# Patient Record
Sex: MALE | Race: WHITE | NOT HISPANIC OR LATINO
[De-identification: names, ages, dates, MRNs, and addresses within clinical notes are randomized per-mention and may not be internally consistent; named-entity substitution may affect disease eponyms.]

---

## 2018-08-14 ENCOUNTER — CHARTING TRANS (OUTPATIENT)
Dept: OTHER | Age: 43
End: 2018-08-14

## 2018-08-29 ENCOUNTER — IMAGING SERVICES (OUTPATIENT)
Dept: OTHER | Age: 43
End: 2018-08-29

## 2018-08-29 ENCOUNTER — CHARTING TRANS (OUTPATIENT)
Dept: OTHER | Age: 43
End: 2018-08-29

## 2018-11-27 VITALS
SYSTOLIC BLOOD PRESSURE: 140 MMHG | WEIGHT: 225 LBS | BODY MASS INDEX: 30.48 KG/M2 | DIASTOLIC BLOOD PRESSURE: 102 MMHG | TEMPERATURE: 98.1 F | OXYGEN SATURATION: 96 % | HEART RATE: 85 BPM | HEIGHT: 72 IN

## 2018-12-15 ENCOUNTER — HOSPITAL ENCOUNTER (OUTPATIENT)
Age: 43
Discharge: HOME | End: 2018-12-15
Attending: EMERGENCY MEDICINE
Payer: COMMERCIAL

## 2018-12-15 VITALS
WEIGHT: 215 LBS | OXYGEN SATURATION: 97 % | HEART RATE: 104 BPM | BODY MASS INDEX: 29 KG/M2 | RESPIRATION RATE: 16 BRPM | DIASTOLIC BLOOD PRESSURE: 93 MMHG | TEMPERATURE: 98 F | SYSTOLIC BLOOD PRESSURE: 144 MMHG

## 2018-12-15 DIAGNOSIS — S61.209A AVULSION OF FINGER TIP, INITIAL ENCOUNTER: Primary | ICD-10-CM

## 2018-12-15 PROCEDURE — 99202 OFFICE O/P NEW SF 15 MIN: CPT

## 2018-12-15 PROCEDURE — 99203 OFFICE O/P NEW LOW 30 MIN: CPT

## 2018-12-15 NOTE — ED PROVIDER NOTES
Patient Seen in: [facility] Immediate Care In [place]    History   Patient presents with:  Laceration Abrasion (integumentary)    Stated Complaint: finger lac    HPI    Pt is 36 yo left handed M who p/w right 2nd finger avulsion s/p cutting it wi diagnosis)    Disposition:  Discharge  12/15/2018  4:32 pm    Follow-up:  Deysi Chinchilla MD  [address] [address]  [phone]      As needed        Medications Prescribed:  There are no discharge medications for this patient.

## 2019-03-20 ENCOUNTER — HOSPITAL ENCOUNTER (OUTPATIENT)
Age: 44
Discharge: HOME | End: 2019-03-20
Attending: EMERGENCY MEDICINE
Payer: COMMERCIAL

## 2019-03-20 ENCOUNTER — NURSE TRIAGE (OUTPATIENT)
Dept: OTHER | Age: 44
End: 2019-03-20

## 2019-03-20 ENCOUNTER — APPOINTMENT (OUTPATIENT)
Dept: CT IMAGING | Age: 44
End: 2019-03-20
Attending: EMERGENCY MEDICINE
Payer: COMMERCIAL

## 2019-03-20 VITALS
OXYGEN SATURATION: 100 % | TEMPERATURE: 98 F | SYSTOLIC BLOOD PRESSURE: 140 MMHG | RESPIRATION RATE: 16 BRPM | BODY MASS INDEX: 30 KG/M2 | WEIGHT: 220 LBS | DIASTOLIC BLOOD PRESSURE: 87 MMHG | HEART RATE: 98 BPM

## 2019-03-20 DIAGNOSIS — I10 HYPERTENSION, UNSPECIFIED TYPE: Primary | ICD-10-CM

## 2019-03-20 DIAGNOSIS — R51.9 ACUTE NONINTRACTABLE HEADACHE, UNSPECIFIED HEADACHE TYPE: ICD-10-CM

## 2019-03-20 LAB
#MXD IC: 0.9 X10ˆ3/UL (ref 0.1–1)
HCT VFR BLD AUTO: 48.5 % (ref 39–53)
HGB BLD-MCNC: 16.8 G/DL (ref 13–17.5)
LYMPHOCYTES # BLD AUTO: 1.3 X10ˆ3/UL (ref 1–4)
LYMPHOCYTES NFR BLD AUTO: 23.3 %
MCH RBC QN AUTO: 31.8 PG (ref 26–34)
MCHC RBC AUTO-ENTMCNC: 34.6 G/DL (ref 31–37)
MCV RBC AUTO: 91.9 FL (ref 80–100)
MIXED CELL %: 15.2 %
NEUTROPHILS # BLD AUTO: 3.4 X10ˆ3/UL (ref 1.5–7.7)
NEUTROPHILS NFR BLD AUTO: 61.5 %
PLATELET # BLD AUTO: 145 X10ˆ3/UL (ref 150–450)
RBC # BLD AUTO: 5.28 X10ˆ6/UL (ref 4.3–5.7)
WBC # BLD AUTO: 5.6 X10ˆ3/UL (ref 4–11)

## 2019-03-20 PROCEDURE — 70450 CT HEAD/BRAIN W/O DYE: CPT | Performed by: EMERGENCY MEDICINE

## 2019-03-20 PROCEDURE — 36415 COLL VENOUS BLD VENIPUNCTURE: CPT

## 2019-03-20 PROCEDURE — 99214 OFFICE O/P EST MOD 30 MIN: CPT

## 2019-03-20 PROCEDURE — 80047 BASIC METABLC PNL IONIZED CA: CPT

## 2019-03-20 PROCEDURE — 85025 COMPLETE CBC W/AUTO DIFF WBC: CPT | Performed by: EMERGENCY MEDICINE

## 2019-03-20 NOTE — ED NOTES
Assumed care for discharge, ct results relayed to patient by dr Georgi Null, discharge instructions given, follow up emphasized

## 2019-03-20 NOTE — ED PROVIDER NOTES
Patient Seen in: [facility] Immediate Care In [address] [place] [address]    History   Patient presents with:  Headache (neurologic)    Stated Complaint: headache    HPI    The patient is a 42-year-old male with no significant past medical history who presents now w Triage Vitals [03/20/19 1149]   BP (!) 142/100   Pulse 98   Resp 16   Temp 97.5 °F (36.4 °C)   Temp src Oral   SpO2 100 %   O2 Device None (Room air)       Current:BP (!) 142/100   Pulse 98   Temp 97.5 °F (36.4 °C) (Oral)   Resp 16   Wt 99.8 kg   SpO2 100% headache, meningitis, infectious causes such as pharyngitis, tension headache and sinusitis                Disposition and Plan     Clinical Impression:  Hypertension, unspecified type  (primary encounter diagnosis)  Acute nonintractable headache, unspecif

## 2019-03-20 NOTE — ED INITIAL ASSESSMENT (HPI)
Headache since Monday with fever. Pt had fever Monday and Tuesday. Pt had his bp checked yesterday and today by wife who is a nurse. +headache,+photosensitivity. No cold like symptoms, no n/v.  Pt has not seen a doctor in 9 years.

## 2019-03-20 NOTE — TELEPHONE ENCOUNTER
Attempted to triage pt and per chart pt has not been seen in 9 years. Advised pt can go to UC or ER. Also advised pt may be able to schedule OV with a provider today to establish care.   Attempted to transfer to [facility] to schedule OV to establish care and c
Fair

## 2019-03-20 NOTE — ED NOTES
Pt sent to [facility] for outpatient CT.     Spoke with Karen Castro RN at [facility]

## 2019-03-29 ENCOUNTER — TELEPHONE (OUTPATIENT)
Dept: INTERNAL MEDICINE CLINIC | Facility: CLINIC | Age: 44
End: 2019-03-29

## 2019-03-29 ENCOUNTER — OFFICE VISIT (OUTPATIENT)
Dept: INTERNAL MEDICINE CLINIC | Facility: CLINIC | Age: 44
End: 2019-03-29
Payer: COMMERCIAL

## 2019-03-29 VITALS
BODY MASS INDEX: 30.2 KG/M2 | SYSTOLIC BLOOD PRESSURE: 109 MMHG | RESPIRATION RATE: 22 BRPM | HEIGHT: 72 IN | DIASTOLIC BLOOD PRESSURE: 73 MMHG | WEIGHT: 223 LBS | HEART RATE: 85 BPM

## 2019-03-29 DIAGNOSIS — R51.9 HEADACHE DISORDER: Primary | ICD-10-CM

## 2019-03-29 PROBLEM — G89.29 CHRONIC BILATERAL LOW BACK PAIN WITHOUT SCIATICA: Status: ACTIVE | Noted: 2019-03-29

## 2019-03-29 PROBLEM — M54.50 CHRONIC BILATERAL LOW BACK PAIN WITHOUT SCIATICA: Status: ACTIVE | Noted: 2019-03-29

## 2019-03-29 PROBLEM — Z96.89 SPINAL CORD STIMULATOR STATUS: Status: ACTIVE | Noted: 2019-03-29

## 2019-03-29 PROCEDURE — 99212 OFFICE O/P EST SF 10 MIN: CPT | Performed by: INTERNAL MEDICINE

## 2019-03-29 PROCEDURE — 99203 OFFICE O/P NEW LOW 30 MIN: CPT | Performed by: INTERNAL MEDICINE

## 2019-03-29 RX ORDER — CYCLOBENZAPRINE HCL 10 MG
10 TABLET ORAL NIGHTLY
Qty: 30 TABLET | Refills: 1 | Status: SHIPPED | OUTPATIENT
Start: 2019-03-29 | End: 2019-03-30

## 2019-03-29 NOTE — TELEPHONE ENCOUNTER
Fax received at [address] [place] [address] with following message. Plan does not cover medication prescribed. Per Rx benefit plan alternative medications include; Metoprolol ER tabs. Please fax back with approval along with strength, directions, quantity, and refills. Current Outpatient Medications:        Metoprolol Succinate 25 MG Oral Capsule ER 24 Hour Sprinkle Take 25 mg by mouth 2 (two) times daily.  Disp: 180 capsule Rfl: 0

## 2019-03-29 NOTE — TELEPHONE ENCOUNTER
Fax received at [facility] requesting clarification. Note states -- We have attached pt allergic to flexeril . .. Please clarify and call/fax us back. .. Thanks.        Current Outpatient Medications:  Cyclobenzaprine HCl 10 MG Oral Tab Take 1 tablet (10 mg total)

## 2019-03-29 NOTE — PROGRESS NOTES
HPI:    Patient ID: Best Cervantes is a 37year old male. Resents for evaluation of the headache  HPI  Patient reports that 2 weeks ago he developed sudden onset of headache associated with high fever for 1 day.   Blood pressure was checked at home and it He is oriented to person, place, and time. He appears well-developed and well-nourished. No distress. HENT:   Head: Normocephalic and atraumatic.    Mouth/Throat: Oropharynx is clear and moist.   Eyes: Conjunctivae and EOM are normal. Pupils are equal, ro Oral Capsule ER 24 Hour Sprinkle 180 capsule 0     Sig: Take 25 mg by mouth 2 (two) times daily.        Imaging & Referrals:  None         OO#[id number]

## 2019-03-29 NOTE — TELEPHONE ENCOUNTER
Spoke with [facility] who reports on their end it says patient is allergic to Flexeril. Attempt made to contact patient to inquire on his allergies to medications; no answer LMTCB. Per chart review no known allergies.

## 2019-03-30 RX ORDER — CHLORZOXAZONE 500 MG/1
500 TABLET ORAL 3 TIMES DAILY PRN
Qty: 60 TABLET | Refills: 0 | Status: SHIPPED | OUTPATIENT
Start: 2019-03-30 | End: 2019-09-20

## 2019-03-30 NOTE — TELEPHONE ENCOUNTER
Spoke to the pharmacy, prescription was replaced by regular metoprolol succinate ER 25 mg capsule taken twice a day

## 2019-03-30 NOTE — TELEPHONE ENCOUNTER
Reviewed message from [employer] pharmacist with pt. Pt states he had Flexeril prescribed 10-11 years ago and thinks he had some side effects from that medication. Doesn't remember exactly what reaction he had, thinks it was psychological (depression).  Pt a

## 2019-03-30 NOTE — TELEPHONE ENCOUNTER
Patient advised of new script sent to pharmacy, reviewed at this time, no other questions, agreed to try alternative tx option sent today.

## 2019-03-31 PROBLEM — I10 ESSENTIAL HYPERTENSION: Status: ACTIVE | Noted: 2019-03-31

## 2019-04-02 ENCOUNTER — TELEPHONE (OUTPATIENT)
Dept: INTERNAL MEDICINE CLINIC | Facility: CLINIC | Age: 44
End: 2019-04-02

## 2019-04-02 RX ORDER — TIZANIDINE 4 MG/1
4 TABLET ORAL EVERY 8 HOURS PRN
Qty: 90 TABLET | Refills: 0 | Status: SHIPPED | OUTPATIENT
Start: 2019-04-02 | End: 2019-05-17

## 2019-04-02 RX ORDER — TIZANIDINE 4 MG/1
TABLET ORAL
Qty: 270 TABLET | Refills: 0 | OUTPATIENT
Start: 2019-04-02

## 2019-04-02 NOTE — TELEPHONE ENCOUNTER
Spoke to pt , change  Medication to  Tizanidine , warned  About  Sedation asked pt to  Report in few days    About  rogers  S

## 2019-04-02 NOTE — TELEPHONE ENCOUNTER
Patient called stating chlorzoxazone 500 MG Oral Tab too expensive. Called pharmacy and spoke to pharmacist and was told a cheaper alternative would be Baclofen, Soma, Tizanidine tablets, or Methocarbamol.     Dr. Josie Larson:   Please advise

## 2019-04-03 NOTE — TELEPHONE ENCOUNTER
Duplicate request.     Filled by MD.         Requested Prescriptions     Pending Prescriptions Disp Refills   • TIZANIDINE HCL 4 MG Oral Tab [Pharmacy Med Name: TIZANIDINE 4MG TABLETS] 270 tablet 0     Sig: TAKE 1 TABLET BY MOUTH EVERY 8 HOURS AS NEEDED

## 2019-04-15 ENCOUNTER — HOSPITAL ENCOUNTER (OUTPATIENT)
Age: 44
Discharge: HOME | End: 2019-04-15
Attending: EMERGENCY MEDICINE
Payer: COMMERCIAL

## 2019-04-15 ENCOUNTER — NURSE TRIAGE (OUTPATIENT)
Dept: OTHER | Age: 44
End: 2019-04-15

## 2019-04-15 VITALS
RESPIRATION RATE: 18 BRPM | HEIGHT: 72 IN | SYSTOLIC BLOOD PRESSURE: 121 MMHG | TEMPERATURE: 98 F | BODY MASS INDEX: 29.12 KG/M2 | DIASTOLIC BLOOD PRESSURE: 86 MMHG | WEIGHT: 215 LBS | OXYGEN SATURATION: 98 % | HEART RATE: 88 BPM

## 2019-04-15 DIAGNOSIS — M54.50 ACUTE BILATERAL LOW BACK PAIN WITHOUT SCIATICA: Primary | ICD-10-CM

## 2019-04-15 PROCEDURE — 99214 OFFICE O/P EST MOD 30 MIN: CPT

## 2019-04-15 PROCEDURE — 99213 OFFICE O/P EST LOW 20 MIN: CPT

## 2019-04-15 RX ORDER — DIAZEPAM 5 MG/1
5 TABLET ORAL 3 TIMES DAILY PRN
Qty: 20 TABLET | Refills: 0 | Status: SHIPPED | OUTPATIENT
Start: 2019-04-15 | End: 2019-04-22

## 2019-04-15 RX ORDER — METHYLPREDNISOLONE 4 MG/1
TABLET ORAL
Qty: 1 PACKAGE | Refills: 0 | Status: SHIPPED | OUTPATIENT
Start: 2019-04-15 | End: 2019-04-20

## 2019-04-15 NOTE — TELEPHONE ENCOUNTER
Action Requested: Summary for Provider     []  Critical Lab, Recommendations Needed  [x] Need Additional Advice  []   FYI    []   Need Orders  [x] Need Medications Sent to Pharmacy  []  Other     SUMMARY: Patient calling with complaint of back pain after f

## 2019-04-15 NOTE — ED PROVIDER NOTES
Patient Seen in: [facility] Immediate Care In [address] [place] [address]    History   Patient presents with:  Back Pain (musculoskeletal)    Stated Complaint: back pain    HPI    The patient is a 69-year-old male with past history of chronic back problems, status p °F (36.8 °C)   Temp src Oral   SpO2 98 %   O2 Device None (Room air)       Current:/86   Pulse 88   Temp 98.3 °F (36.8 °C) (Oral)   Resp 18   Ht 182.9 cm (6')   Wt 97.5 kg   SpO2 98%   BMI 29.16 kg/m²         Physical Exam    Constitutional: Catie Flash [address] [facility] [address] [phone]    In 3 days          Medications Prescribed:  Discharge Medication List as of 4/15/2019  1:37 PM    START taking these medications    methylPREDNISolone 4 MG Oral Tablet Therapy Pack  Dosepack: use as directed on p

## 2019-04-15 NOTE — TELEPHONE ENCOUNTER
With history of injury he needs to be see  Any doctor at the  Clinic or  immidiate  Care , I cannot treat him by phone

## 2019-04-15 NOTE — ED INITIAL ASSESSMENT (HPI)
Slipped on ice yesterday injured hos low back hx of chronic low back pain and surgeries used meds for pain he already had and did not help

## 2019-04-16 NOTE — TELEPHONE ENCOUNTER
Patient seen in immediate care and diagnosed with Acute bilateral low back pain without sciatica. Patient given Medrol dose pack and diazepam.   Patient advised to follow-up with Dr. Lou Yeager (pain doctor) in three days.

## 2019-05-18 RX ORDER — TIZANIDINE 4 MG/1
TABLET ORAL
Qty: 90 TABLET | Refills: 1 | Status: SHIPPED | OUTPATIENT
Start: 2019-05-18 | End: 2019-09-20

## 2019-05-18 NOTE — TELEPHONE ENCOUNTER
Review pended refill request as it does not fall under a protocol.     Requested Prescriptions     Pending Prescriptions Disp Refills   • TIZANIDINE HCL 4 MG Oral Tab [Pharmacy Med Name: TIZANIDINE 4MG TABLETS] 90 tablet 0     Sig: TAKE 1 TABLET BY MOUTH EV

## 2019-07-08 RX ORDER — METOPROLOL SUCCINATE 25 MG/1
TABLET, EXTENDED RELEASE ORAL
Qty: 180 TABLET | Refills: 0 | OUTPATIENT
Start: 2019-07-08

## 2019-07-09 NOTE — TELEPHONE ENCOUNTER
Per our records we never prescribed metoprolol to this patient, please call patient confirm if he takes this medication or not

## 2019-07-31 RX ORDER — METOPROLOL SUCCINATE 25 MG/1
TABLET, EXTENDED RELEASE ORAL
Qty: 180 TABLET | Refills: 0 | Status: SHIPPED | OUTPATIENT
Start: 2019-07-31 | End: 2019-10-08

## 2019-07-31 NOTE — TELEPHONE ENCOUNTER
Please review; protocol failed.   No labwork on file,also med not on active med list.  Hypertensive Medications  Protocol Criteria:  · Appointment scheduled in the past 6 months or in the next 3 months  · BMP or CMP in the past 12 months  · Creatinine resul

## 2019-09-14 ENCOUNTER — TELEPHONE (OUTPATIENT)
Dept: OTHER | Age: 44
End: 2019-09-14

## 2019-09-14 NOTE — TELEPHONE ENCOUNTER
Pt asking to talk to doctor about a problem he has been experiencing since starting Metoprolol ER. Pt has been experiencing erectile dysfunction and thinks may be related to medication. Pt denies any difficulty urinating. Please advise.

## 2019-09-14 NOTE — TELEPHONE ENCOUNTER
Spoke spoke to patient, advised him to make appointment for evaluation he agreed he will call on Monday

## 2019-09-19 ENCOUNTER — LAB ENCOUNTER (OUTPATIENT)
Dept: LAB | Age: 44
End: 2019-09-19
Attending: INTERNAL MEDICINE
Payer: COMMERCIAL

## 2019-09-19 DIAGNOSIS — R51.9 HEADACHE DISORDER: ICD-10-CM

## 2019-09-19 LAB
ALBUMIN SERPL-MCNC: 4.2 G/DL (ref 3.4–5)
ALP LIVER SERPL-CCNC: 77 U/L (ref 45–117)
ALT SERPL-CCNC: 148 U/L (ref 16–61)
AST SERPL-CCNC: 93 U/L (ref 15–37)
BASOPHILS # BLD AUTO: 0.03 X10(3) UL (ref 0–0.2)
BASOPHILS NFR BLD AUTO: 0.5 %
BILIRUB DIRECT SERPL-MCNC: 0.3 MG/DL (ref 0–0.2)
BILIRUB SERPL-MCNC: 0.8 MG/DL (ref 0.1–2)
CHOLEST SMN-MCNC: 172 MG/DL (ref ?–200)
CRP SERPL-MCNC: <0.29 MG/DL (ref ?–0.3)
DEPRECATED RDW RBC AUTO: 40.4 FL (ref 35.1–46.3)
EOSINOPHIL # BLD AUTO: 0.03 X10(3) UL (ref 0–0.7)
EOSINOPHIL NFR BLD AUTO: 0.5 %
ERYTHROCYTE [DISTWIDTH] IN BLOOD BY AUTOMATED COUNT: 12.1 % (ref 11–15)
ERYTHROCYTE [SEDIMENTATION RATE] IN BLOOD: 9 MM/HR (ref 0–15)
HCT VFR BLD AUTO: 42.4 % (ref 39–53)
HDLC SERPL-MCNC: 88 MG/DL (ref 40–59)
HGB BLD-MCNC: 15.1 G/DL (ref 13–17.5)
IMM GRANULOCYTES # BLD AUTO: 0.01 X10(3) UL (ref 0–1)
IMM GRANULOCYTES NFR BLD: 0.2 %
LDLC SERPL CALC-MCNC: 65 MG/DL (ref ?–100)
LYMPHOCYTES # BLD AUTO: 1.27 X10(3) UL (ref 1–4)
LYMPHOCYTES NFR BLD AUTO: 20 %
M PROTEIN MFR SERPL ELPH: 8.2 G/DL (ref 6.4–8.2)
MCH RBC QN AUTO: 32.8 PG (ref 26–34)
MCHC RBC AUTO-ENTMCNC: 35.6 G/DL (ref 31–37)
MCV RBC AUTO: 92 FL (ref 80–100)
MONOCYTES # BLD AUTO: 0.71 X10(3) UL (ref 0.1–1)
MONOCYTES NFR BLD AUTO: 11.2 %
NEUTROPHILS # BLD AUTO: 4.3 X10 (3) UL (ref 1.5–7.7)
NEUTROPHILS # BLD AUTO: 4.3 X10(3) UL (ref 1.5–7.7)
NEUTROPHILS NFR BLD AUTO: 67.6 %
NONHDLC SERPL-MCNC: 84 MG/DL (ref ?–130)
PATIENT FASTING: YES
PLATELET # BLD AUTO: 166 10(3)UL (ref 150–450)
RBC # BLD AUTO: 4.61 X10(6)UL (ref 4.3–5.7)
TRIGL SERPL-MCNC: 93 MG/DL (ref 30–149)
TSI SER-ACNC: 1.16 MIU/ML (ref 0.36–3.74)
VLDLC SERPL CALC-MCNC: 19 MG/DL (ref 0–30)
WBC # BLD AUTO: 6.4 X10(3) UL (ref 4–11)

## 2019-09-19 PROCEDURE — 36415 COLL VENOUS BLD VENIPUNCTURE: CPT

## 2019-09-19 PROCEDURE — 85652 RBC SED RATE AUTOMATED: CPT

## 2019-09-19 PROCEDURE — 84443 ASSAY THYROID STIM HORMONE: CPT

## 2019-09-19 PROCEDURE — 85025 COMPLETE CBC W/AUTO DIFF WBC: CPT

## 2019-09-19 PROCEDURE — 80076 HEPATIC FUNCTION PANEL: CPT

## 2019-09-19 PROCEDURE — 80061 LIPID PANEL: CPT

## 2019-09-19 PROCEDURE — 86140 C-REACTIVE PROTEIN: CPT

## 2019-09-20 ENCOUNTER — OFFICE VISIT (OUTPATIENT)
Dept: INTERNAL MEDICINE CLINIC | Facility: CLINIC | Age: 44
End: 2019-09-20
Payer: COMMERCIAL

## 2019-09-20 VITALS
WEIGHT: 199 LBS | RESPIRATION RATE: 22 BRPM | HEART RATE: 83 BPM | BODY MASS INDEX: 27 KG/M2 | DIASTOLIC BLOOD PRESSURE: 72 MMHG | SYSTOLIC BLOOD PRESSURE: 120 MMHG

## 2019-09-20 DIAGNOSIS — N52.8 OTHER MALE ERECTILE DYSFUNCTION: ICD-10-CM

## 2019-09-20 DIAGNOSIS — I10 ESSENTIAL HYPERTENSION: Primary | ICD-10-CM

## 2019-09-20 PROCEDURE — 99214 OFFICE O/P EST MOD 30 MIN: CPT | Performed by: INTERNAL MEDICINE

## 2019-09-20 RX ORDER — TADALAFIL 2.5 MG/1
TABLET ORAL
Qty: 60 TABLET | Refills: 1 | Status: SHIPPED | OUTPATIENT
Start: 2019-09-20 | End: 2020-10-12

## 2019-09-20 RX ORDER — LISINOPRIL 5 MG/1
5 TABLET ORAL DAILY
Qty: 90 TABLET | Refills: 0 | Status: SHIPPED | OUTPATIENT
Start: 2019-09-20 | End: 2021-07-27

## 2019-09-21 NOTE — PROGRESS NOTES
HPI:    Patient ID: Aidee Martines is a 40year old male.   Presents for follow-up on hypertension, erectile dysfunction     HPI  Patient reports that he has been feeling great, he has lost weight, his blood pressure is running very nicely low, wife who is He appears well-developed and well-nourished. No distress. HENT:   Head: Normocephalic and atraumatic.    Right Ear: No cerumen present  Left Ear: No cerumen present  Mouth/Throat: Oropharynx is clear and moist.   Eyes: Pupils are equal, round, and reacti Tadalafil (CIALIS) 2.5 MG Oral Tab 60 tablet 1     Sig: Take  1 tab po daily   • lisinopril 5 MG Oral Tab 90 tablet 0     Sig: Take 1 tablet (5 mg total) by mouth daily.        Imaging & Referrals:  None         [id number]

## 2019-09-23 ENCOUNTER — APPOINTMENT (OUTPATIENT)
Dept: CT IMAGING | Facility: HOSPITAL | Age: 44
End: 2019-09-23
Payer: COMMERCIAL

## 2019-09-23 ENCOUNTER — HOSPITAL ENCOUNTER (OUTPATIENT)
Age: 44
Discharge: ANOTHER HOSPITAL | End: 2019-09-23
Attending: EMERGENCY MEDICINE
Payer: COMMERCIAL

## 2019-09-23 ENCOUNTER — HOSPITAL ENCOUNTER (EMERGENCY)
Facility: HOSPITAL | Age: 44
Discharge: HOME | End: 2019-09-23
Attending: EMERGENCY MEDICINE
Payer: COMMERCIAL

## 2019-09-23 VITALS
TEMPERATURE: 98 F | HEART RATE: 71 BPM | SYSTOLIC BLOOD PRESSURE: 135 MMHG | OXYGEN SATURATION: 98 % | RESPIRATION RATE: 18 BRPM | BODY MASS INDEX: 27.09 KG/M2 | WEIGHT: 200 LBS | DIASTOLIC BLOOD PRESSURE: 92 MMHG | HEIGHT: 72 IN

## 2019-09-23 VITALS
HEIGHT: 72 IN | BODY MASS INDEX: 26.95 KG/M2 | RESPIRATION RATE: 18 BRPM | TEMPERATURE: 98 F | DIASTOLIC BLOOD PRESSURE: 100 MMHG | WEIGHT: 199 LBS | OXYGEN SATURATION: 97 % | HEART RATE: 63 BPM | SYSTOLIC BLOOD PRESSURE: 141 MMHG

## 2019-09-23 DIAGNOSIS — S00.93XA CONTUSION OF HEAD, UNSPECIFIED PART OF HEAD, INITIAL ENCOUNTER: Primary | ICD-10-CM

## 2019-09-23 DIAGNOSIS — S09.90XA INJURY OF HEAD, INITIAL ENCOUNTER: Primary | ICD-10-CM

## 2019-09-23 PROCEDURE — 70450 CT HEAD/BRAIN W/O DYE: CPT | Performed by: EMERGENCY MEDICINE

## 2019-09-23 PROCEDURE — 99213 OFFICE O/P EST LOW 20 MIN: CPT

## 2019-09-23 PROCEDURE — 96374 THER/PROPH/DIAG INJ IV PUSH: CPT

## 2019-09-23 PROCEDURE — 99212 OFFICE O/P EST SF 10 MIN: CPT

## 2019-09-23 PROCEDURE — 96361 HYDRATE IV INFUSION ADD-ON: CPT

## 2019-09-23 PROCEDURE — 96375 TX/PRO/DX INJ NEW DRUG ADDON: CPT

## 2019-09-23 PROCEDURE — 99284 EMERGENCY DEPT VISIT MOD MDM: CPT

## 2019-09-23 RX ORDER — ONDANSETRON 2 MG/ML
4 INJECTION INTRAMUSCULAR; INTRAVENOUS ONCE
Status: COMPLETED | OUTPATIENT
Start: 2019-09-23 | End: 2019-09-23

## 2019-09-23 RX ORDER — MORPHINE SULFATE 4 MG/ML
4 INJECTION, SOLUTION INTRAMUSCULAR; INTRAVENOUS ONCE
Status: COMPLETED | OUTPATIENT
Start: 2019-09-23 | End: 2019-09-23

## 2019-09-23 RX ORDER — TRAMADOL HYDROCHLORIDE 50 MG/1
TABLET ORAL EVERY 6 HOURS PRN
Qty: 12 TABLET | Refills: 0 | Status: SHIPPED | OUTPATIENT
Start: 2019-09-23 | End: 2019-09-30

## 2019-09-23 RX ORDER — SODIUM CHLORIDE 9 MG/ML
INJECTION, SOLUTION INTRAVENOUS CONTINUOUS
Status: DISCONTINUED | OUTPATIENT
Start: 2019-09-23 | End: 2019-09-23

## 2019-09-23 NOTE — ED NOTES
Pt states he lifted his head up from a leaning position and whacked the top pf his head on a metal car latch- states this happened around 630 am today- pt states he immediately vomited x 1 and then took Motrin- still having pain. Pt medicated for pain.

## 2019-09-23 NOTE — ED INITIAL ASSESSMENT (HPI)
Pt hit head with corner of car tailgate this morning. Pt had a vomiting episode right after injury, pt c/o light sensitivity and pain.

## 2019-09-23 NOTE — ED PROVIDER NOTES
Patient Seen in: [facility] Emergency Department    History   Patient presents with:  Head Neck Injury (neurologic, musculoskeletal)    Stated Complaint:     HPI    Patient here with complaint of head injury.   Injury occurred this morning hit head 142/82   Pulse 61   Resp 16   Temp 98 °F (36.7 °C)   Temp src Temporal   SpO2 98 %   O2 Device None (Room air)       Current:BP (!) 129/109   Pulse 61   Temp 98 °F (36.7 °C) (Temporal)   Resp 18   Ht 182.9 cm (6')   Wt 90.3 kg   SpO2 95%   BMI 26.99 kg/m² Medication List    START taking these medications    traMADol HCl 50 MG Oral Tab  Take 1-2 tablets ( mg total) by mouth every 6 (six) hours as needed for Pain.   Qty: 12 tablet Refills: 0

## 2019-09-23 NOTE — ED PROVIDER NOTES
Patient Seen in: [facility] Immediate Care In [address] [place] [address]    History   Patient presents with:  Head Neck Injury (neurologic, musculoskeletal)    Stated Complaint: hit head    HPI    Patient here with complaint of head injury.   Injury occurred this mo Triage Vitals [09/23/19 0934]   BP (!) 135/92   Pulse 71   Resp 18   Temp 98.1 °F (36.7 °C)   Temp src Oral   SpO2 98 %   O2 Device None (Room air)       Current:BP (!) 135/92   Pulse 71   Temp 98.1 °F (36.7 °C) (Oral)   Resp 18   Ht 182.9 cm (6')   Wt 90.

## 2019-09-27 ENCOUNTER — TELEPHONE (OUTPATIENT)
Dept: INTERNAL MEDICINE CLINIC | Facility: CLINIC | Age: 44
End: 2019-09-27

## 2019-09-27 NOTE — TELEPHONE ENCOUNTER
Patient called want to do a follow up appointment from a concussion no system currently but want to come in by this Monday September 30, 2019      First available was Oct 11, 2019 want to see Dr. uJan Stephens before then    Please advise      Please reply to katrina

## 2019-09-27 NOTE — TELEPHONE ENCOUNTER
Patient stated he can see you on Oct. 1.    Can we use your RES24 slot on Oct. 1 at 11:40am for a hospital follow up? Please confirm. Thank you.

## 2019-09-27 NOTE — TELEPHONE ENCOUNTER
I cannot see patient sooner than Tuesday, October 1, patient can see any other physician at the clinic physician assistant at [facility], [address] [facility]

## 2019-10-01 ENCOUNTER — OFFICE VISIT (OUTPATIENT)
Dept: INTERNAL MEDICINE CLINIC | Facility: CLINIC | Age: 44
End: 2019-10-01
Payer: COMMERCIAL

## 2019-10-01 ENCOUNTER — HOSPITAL ENCOUNTER (OUTPATIENT)
Dept: CT IMAGING | Age: 44
Discharge: HOME | End: 2019-10-01
Attending: INTERNAL MEDICINE
Payer: COMMERCIAL

## 2019-10-01 ENCOUNTER — TELEPHONE (OUTPATIENT)
Dept: INTERNAL MEDICINE CLINIC | Facility: CLINIC | Age: 44
End: 2019-10-01

## 2019-10-01 VITALS
WEIGHT: 200 LBS | DIASTOLIC BLOOD PRESSURE: 80 MMHG | SYSTOLIC BLOOD PRESSURE: 135 MMHG | HEART RATE: 86 BPM | RESPIRATION RATE: 22 BRPM | BODY MASS INDEX: 27 KG/M2

## 2019-10-01 DIAGNOSIS — S06.0X0A CONCUSSION WITHOUT LOSS OF CONSCIOUSNESS, INITIAL ENCOUNTER: ICD-10-CM

## 2019-10-01 DIAGNOSIS — R51.9 HEADACHE DISORDER: Primary | ICD-10-CM

## 2019-10-01 DIAGNOSIS — H53.8 BLURRED VISION, BILATERAL: ICD-10-CM

## 2019-10-01 DIAGNOSIS — R51.9 HEADACHE DISORDER: ICD-10-CM

## 2019-10-01 PROCEDURE — 70450 CT HEAD/BRAIN W/O DYE: CPT | Performed by: INTERNAL MEDICINE

## 2019-10-01 PROCEDURE — 1111F DSCHRG MED/CURRENT MED MERGE: CPT | Performed by: INTERNAL MEDICINE

## 2019-10-01 PROCEDURE — 99214 OFFICE O/P EST MOD 30 MIN: CPT | Performed by: INTERNAL MEDICINE

## 2019-10-01 NOTE — PROGRESS NOTES
HPI:    Patient ID: Anne Marie Fay is a 40year old male.   Presents for follow-up head injury    HPI  September 23 at work patient   bumped his head against ramirez or the truck  started to experience headache associated with nausea was seen in the emergency Pulse 86   Resp 22   Wt 200 lb (90.7 kg)   BMI 27.12 kg/m²    Physical Exam    Constitutional: He is oriented to person, place, and time. He appears well-developed and well-nourished. No distress. HENT:   Head: Normocephalic and atraumatic.    Eyes: Pupi

## 2019-10-02 ENCOUNTER — TELEPHONE (OUTPATIENT)
Dept: INTERNAL MEDICINE CLINIC | Facility: CLINIC | Age: 44
End: 2019-10-02

## 2019-10-02 NOTE — TELEPHONE ENCOUNTER
Left message for the patient to acknowledge that he is getting my messages regarding his CT scan results, he did not check my chart, left message for his wife Abbie Lilly about the same

## 2019-10-03 NOTE — TELEPHONE ENCOUNTER
Patient returned your call. He is getting your messages. He does have an appointment with neurology, not until December.  He states he is feeling slightly better but still \"a little out of it,\" slight headache, tired \"has to think a little harder about t

## 2019-10-10 RX ORDER — METOPROLOL SUCCINATE 25 MG/1
TABLET, EXTENDED RELEASE ORAL
Qty: 180 TABLET | Refills: 0 | Status: SHIPPED | OUTPATIENT
Start: 2019-10-10 | End: 2020-01-16

## 2019-10-10 NOTE — TELEPHONE ENCOUNTER
Please review; protocol failed. Two sigs listed on med record. Take BID and take daily.   Please advise

## 2019-10-10 NOTE — TELEPHONE ENCOUNTER
Hypertensive Medications  Protocol Criteria:  · Appointment scheduled in the past 6 months or in the next 3 months  · BMP or CMP in the past 12 months  · Creatinine result < 2  Recent Outpatient Visits            1 week ago Headache disorder    [facility]

## 2019-11-20 RX ORDER — TADALAFIL 2.5 MG/1
TABLET ORAL
Qty: 30 TABLET | Refills: 0 | OUTPATIENT
Start: 2019-11-20

## 2019-11-20 RX ORDER — TADALAFIL 5 MG/1
5 TABLET ORAL
Qty: 30 TABLET | Refills: 0 | Status: SHIPPED | OUTPATIENT
Start: 2019-11-20 | End: 2020-02-17

## 2020-01-15 NOTE — TELEPHONE ENCOUNTER
Hypertensive Medications. PROTOCOL FAILED. PLEASE ADVISE ON REFILL. THANKS.       Result Notes for CBC W/ DIFFERENTIAL     Notes recorded by Stacy Vaughan MD on 9/26/2019 at 7:07 PM CDT  Reviewed with patient during office visit, normal CBC TSH sed rate an

## 2020-01-16 RX ORDER — METOPROLOL SUCCINATE 25 MG/1
TABLET, EXTENDED RELEASE ORAL
Qty: 180 TABLET | Refills: 0 | Status: SHIPPED | OUTPATIENT
Start: 2020-01-16 | End: 2021-07-27

## 2020-01-28 ENCOUNTER — TELEPHONE (OUTPATIENT)
Dept: NEUROLOGY | Facility: CLINIC | Age: 45
End: 2020-01-28

## 2020-01-28 NOTE — TELEPHONE ENCOUNTER
Please do not give him another appointment with me. He did not show for his appointment. He did not call to cancel.   Thank you

## 2020-02-17 RX ORDER — TADALAFIL 5 MG/1
TABLET ORAL
Qty: 30 TABLET | Refills: 1 | Status: SHIPPED | OUTPATIENT
Start: 2020-02-17 | End: 2020-03-26

## 2020-02-17 NOTE — TELEPHONE ENCOUNTER
Refill passed per [facility] protocol.   Refill Protocol Appointment Criteria  · Appointment scheduled in the past 6 months or in the next 3 months  Recent Outpatient Visits            4 months ago Headache disorder    [facility], [address] [place] [address]

## 2020-03-26 RX ORDER — TADALAFIL 5 MG/1
TABLET ORAL
Qty: 30 TABLET | Refills: 0 | Status: SHIPPED | OUTPATIENT
Start: 2020-03-26 | End: 2020-04-15

## 2020-04-15 RX ORDER — TADALAFIL 5 MG/1
TABLET ORAL
Qty: 30 TABLET | Refills: 0 | Status: SHIPPED | OUTPATIENT
Start: 2020-04-15 | End: 2020-05-01

## 2020-04-22 ENCOUNTER — TELEPHONE (OUTPATIENT)
Dept: INTERNAL MEDICINE CLINIC | Facility: CLINIC | Age: 45
End: 2020-04-22

## 2020-04-29 NOTE — TELEPHONE ENCOUNTER
I would like to speak to the patient from video or virtual phone visit, I have a request to refill Cialis, prescription was given to him 2 weeks ago for 30 tablets and another request now, please call patient

## 2020-05-01 ENCOUNTER — VIRTUAL PHONE E/M (OUTPATIENT)
Dept: INTERNAL MEDICINE CLINIC | Facility: CLINIC | Age: 45
End: 2020-05-01
Payer: COMMERCIAL

## 2020-05-01 DIAGNOSIS — I10 ESSENTIAL HYPERTENSION: ICD-10-CM

## 2020-05-01 DIAGNOSIS — N52.8 OTHER MALE ERECTILE DYSFUNCTION: Primary | ICD-10-CM

## 2020-05-01 PROCEDURE — 99212 OFFICE O/P EST SF 10 MIN: CPT | Performed by: INTERNAL MEDICINE

## 2020-05-01 RX ORDER — TADALAFIL 5 MG/1
TABLET ORAL
Qty: 30 TABLET | Refills: 2 | Status: SHIPPED | OUTPATIENT
Start: 2020-05-01 | End: 2020-07-27

## 2020-05-01 NOTE — PROGRESS NOTES
Virtual Telephone Check-In    Chapincito Mejia verbally consents to a Virtual/Telephone Check-In visit on 5/1/2020      Patient understands and accepts financial responsibility for any deductible, co-insurance and/or co-pays associated with this service. speak in full sentences, does not sound in any distress         ASSESSMENT/PLAN:   (N52.8) Other male erectile dysfunction  (primary encounter diagnosis)  Plan: Continue Cialis 5 mg daily follow-up in 6 months for physical exam    (I10) Essential hypertens

## 2020-05-06 ENCOUNTER — TELEPHONE (OUTPATIENT)
Dept: INTERNAL MEDICINE CLINIC | Facility: CLINIC | Age: 45
End: 2020-05-06
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
## 2020-05-11 ENCOUNTER — VIRTUAL PHONE E/M (OUTPATIENT)
Dept: INTERNAL MEDICINE CLINIC | Facility: CLINIC | Age: 45
End: 2020-05-11
Payer: COMMERCIAL

## 2020-05-11 ENCOUNTER — TELEPHONE (OUTPATIENT)
Dept: INTERNAL MEDICINE CLINIC | Facility: CLINIC | Age: 45
End: 2020-05-11

## 2020-05-11 ENCOUNTER — NURSE TRIAGE (OUTPATIENT)
Dept: INTERNAL MEDICINE CLINIC | Facility: CLINIC | Age: 45
End: 2020-05-11

## 2020-05-11 DIAGNOSIS — B34.9 VIRAL SYNDROME: Primary | ICD-10-CM

## 2020-05-11 PROCEDURE — 99212 OFFICE O/P EST SF 10 MIN: CPT | Performed by: INTERNAL MEDICINE

## 2020-05-11 NOTE — TELEPHONE ENCOUNTER
Please reply to pool: EM RN TRIAGE  Action Requested: Summary for Provider     []  Critical Lab, Recommendations Needed  [] Need Additional Advice  [x]   FYI    []   Need Orders  [] Need Medications Sent to Pharmacy  []  Other     SUMMARY: Offered

## 2020-05-11 NOTE — TELEPHONE ENCOUNTER
Spoke to patient, today reviewed his symptoms, advised that diarrhea chills could be sign of COVID-19 infection, he should not be returning to work, quarantine for 10 to 14 days advisable, reviewed importance of staying in separate room, practice good hygi 100

## 2020-05-14 NOTE — PROGRESS NOTES
Virtual Telephone Check-In    Eboni Tobias verbally consents to a Virtual/Telephone Check-In visit on 5/11/2020      Patient understands and accepts financial responsibility for any deductible, co-insurance and/or co-pays associated with this service. with patient that symptoms he has could be signs of COVID-19 infection, advised him to quarantine himself in his house, no work for 10 to 14 days, check temperature twice a day, practice good hygiene, bland diet avoid fruits and vegetables for few days, ke

## 2020-06-08 ENCOUNTER — NURSE TRIAGE (OUTPATIENT)
Dept: INTERNAL MEDICINE CLINIC | Facility: CLINIC | Age: 45
End: 2020-06-08

## 2020-06-08 NOTE — TELEPHONE ENCOUNTER
Action Requested: Summary for Provider     []  Critical Lab, Recommendations Needed  [] Need Additional Advice  []   FYI    []   Need Orders  [] Need Medications Sent to Pharmacy  []  Other     SUMMARY: Per protocol advised : OV within  3 days  Has appt fr

## 2020-06-14 NOTE — PROGRESS NOTES
HPI:    Patient ID: Evie Lawton is a 40year old male.   Presents for evaluation of anxiety depression  HPI  Patient reports that he is feeling extremely stressed out, with coronavirus pandemic situation, his business and sells side, affected in the area rate, regular rhythm and normal heart sounds. Psychiatric: His speech is normal and behavior is normal. Judgment and thought content normal. His mood appears anxious. He expresses no homicidal and no suicidal ideation.               ASSESSMENT/PLAN:   An

## 2020-07-18 ENCOUNTER — HOSPITAL ENCOUNTER (OUTPATIENT)
Age: 45
Discharge: HOME | End: 2020-07-18
Attending: EMERGENCY MEDICINE
Payer: COMMERCIAL

## 2020-07-18 ENCOUNTER — APPOINTMENT (OUTPATIENT)
Dept: GENERAL RADIOLOGY | Age: 45
End: 2020-07-18
Attending: EMERGENCY MEDICINE
Payer: COMMERCIAL

## 2020-07-18 VITALS
WEIGHT: 205 LBS | BODY MASS INDEX: 27.77 KG/M2 | DIASTOLIC BLOOD PRESSURE: 80 MMHG | HEIGHT: 72 IN | OXYGEN SATURATION: 99 % | SYSTOLIC BLOOD PRESSURE: 163 MMHG | TEMPERATURE: 99 F | RESPIRATION RATE: 20 BRPM | HEART RATE: 100 BPM

## 2020-07-18 DIAGNOSIS — S69.92XA INJURY OF FINGER OF LEFT HAND, INITIAL ENCOUNTER: Primary | ICD-10-CM

## 2020-07-18 DIAGNOSIS — S60.222A CONTUSION OF LEFT HAND, INITIAL ENCOUNTER: ICD-10-CM

## 2020-07-18 PROCEDURE — 99213 OFFICE O/P EST LOW 20 MIN: CPT | Performed by: EMERGENCY MEDICINE

## 2020-07-18 PROCEDURE — 73140 X-RAY EXAM OF FINGER(S): CPT | Performed by: EMERGENCY MEDICINE

## 2020-07-18 NOTE — ED PROVIDER NOTES
Patient Seen in: [facility] Immediate Care In [address] [place] [address]      History   Patient presents with:  Finger Injury    Stated Complaint: left hand pain    HPI  Patient states he sustained a crush injury on manufacturing machine that was under spring tensi Vitals signs and nursing note reviewed. Constitutional:       General: He is not in acute distress. Appearance: He is well-developed. Comments: Well appearing   HENT:      Head: Normocephalic and atraumatic.       Right Ear: External ear normal.

## 2020-07-18 NOTE — ED NOTES
Pt came to the desk asking if he could us the sani-cloth germicidal wipes for his hands. I explained to pt that the wipes are not to be used on skin and gloves must be worn when using these wipes.  Pt states \"I don't care\" and proceeds to take wipes out o

## 2020-07-20 ENCOUNTER — TELEPHONE (OUTPATIENT)
Dept: INTERNAL MEDICINE CLINIC | Facility: CLINIC | Age: 45
End: 2020-07-20

## 2020-07-27 RX ORDER — TADALAFIL 5 MG/1
TABLET ORAL
Qty: 30 TABLET | Refills: 0 | Status: SHIPPED | OUTPATIENT
Start: 2020-07-27 | End: 2020-08-26

## 2020-08-26 RX ORDER — TADALAFIL 5 MG/1
TABLET ORAL
Qty: 30 TABLET | Refills: 0 | Status: SHIPPED | OUTPATIENT
Start: 2020-08-26 | End: 2020-09-17

## 2020-09-17 RX ORDER — TADALAFIL 5 MG/1
TABLET ORAL
Qty: 30 TABLET | Refills: 0 | Status: SHIPPED | OUTPATIENT
Start: 2020-09-17 | End: 2020-10-13

## 2020-10-11 NOTE — TELEPHONE ENCOUNTER
Please check pharmacy how many pills per month insurance covers for tadalafil, and how often patient picks up prescription please at least last 4 months

## 2020-10-12 ENCOUNTER — HOSPITAL ENCOUNTER (OUTPATIENT)
Dept: CT IMAGING | Age: 45
Discharge: HOME | End: 2020-10-12
Attending: INTERNAL MEDICINE
Payer: COMMERCIAL

## 2020-10-12 ENCOUNTER — TELEPHONE (OUTPATIENT)
Dept: INTERNAL MEDICINE CLINIC | Facility: CLINIC | Age: 45
End: 2020-10-12

## 2020-10-12 ENCOUNTER — LAB ENCOUNTER (OUTPATIENT)
Dept: LAB | Age: 45
End: 2020-10-12
Attending: INTERNAL MEDICINE
Payer: COMMERCIAL

## 2020-10-12 ENCOUNTER — NURSE TRIAGE (OUTPATIENT)
Dept: INTERNAL MEDICINE CLINIC | Facility: CLINIC | Age: 45
End: 2020-10-12

## 2020-10-12 ENCOUNTER — OFFICE VISIT (OUTPATIENT)
Dept: INTERNAL MEDICINE CLINIC | Facility: CLINIC | Age: 45
End: 2020-10-12
Payer: COMMERCIAL

## 2020-10-12 VITALS
RESPIRATION RATE: 18 BRPM | BODY MASS INDEX: 28 KG/M2 | WEIGHT: 204 LBS | SYSTOLIC BLOOD PRESSURE: 158 MMHG | HEART RATE: 77 BPM | DIASTOLIC BLOOD PRESSURE: 90 MMHG

## 2020-10-12 DIAGNOSIS — R10.11 RIGHT UPPER QUADRANT ABDOMINAL PAIN: ICD-10-CM

## 2020-10-12 DIAGNOSIS — R10.11 RIGHT UPPER QUADRANT ABDOMINAL PAIN: Primary | ICD-10-CM

## 2020-10-12 PROCEDURE — 74177 CT ABD & PELVIS W/CONTRAST: CPT | Performed by: INTERNAL MEDICINE

## 2020-10-12 PROCEDURE — 99214 OFFICE O/P EST MOD 30 MIN: CPT | Performed by: INTERNAL MEDICINE

## 2020-10-12 PROCEDURE — 3077F SYST BP >= 140 MM HG: CPT | Performed by: INTERNAL MEDICINE

## 2020-10-12 PROCEDURE — 36415 COLL VENOUS BLD VENIPUNCTURE: CPT

## 2020-10-12 PROCEDURE — 80053 COMPREHEN METABOLIC PANEL: CPT

## 2020-10-12 PROCEDURE — 82565 ASSAY OF CREATININE: CPT

## 2020-10-12 PROCEDURE — 82150 ASSAY OF AMYLASE: CPT

## 2020-10-12 PROCEDURE — 83690 ASSAY OF LIPASE: CPT

## 2020-10-12 PROCEDURE — 3080F DIAST BP >= 90 MM HG: CPT | Performed by: INTERNAL MEDICINE

## 2020-10-12 PROCEDURE — 85025 COMPLETE CBC W/AUTO DIFF WBC: CPT

## 2020-10-12 NOTE — TELEPHONE ENCOUNTER
Action Requested: Summary for Provider     []  Critical Lab, Recommendations Needed  [] Need Additional Advice  []   FYI    []   Need Orders  [] Need Medications Sent to Pharmacy  []  Other     SUMMARY:sent to ER - Pt c/c upper right quadrant pain,nausea

## 2020-10-12 NOTE — TELEPHONE ENCOUNTER
[facility] who reports the results for the stat labs ordered today 10/12/20 have posted and there is nothing critical. Dr. Zoraida Shukla made aware.

## 2020-10-12 NOTE — TELEPHONE ENCOUNTER
I can add patient to my schedule today at 320, he needs evaluation in the office so I know what testing needs to be ordered, advised patient not to eat or drink from now on, so he is n.p.o. in case testing will be done today

## 2020-10-12 NOTE — TELEPHONE ENCOUNTER
Patient scheduled for today.     Future Appointments   Date Time Provider Aimee Banda   10/12/2020  3:20 PM Susanna Rai MD [address]

## 2020-10-12 NOTE — TELEPHONE ENCOUNTER
Called [employer] at [phone] for prior auth . Per NK pt. Need stat CT abdomen and pelvis CPT code 21920.     Spoke with Peewee Borges approval number [id number]  Case # [id number]

## 2020-10-13 ENCOUNTER — TELEPHONE (OUTPATIENT)
Dept: INTERNAL MEDICINE CLINIC | Facility: CLINIC | Age: 45
End: 2020-10-13

## 2020-10-13 DIAGNOSIS — R10.11 RIGHT UPPER QUADRANT ABDOMINAL PAIN: Primary | ICD-10-CM

## 2020-10-13 RX ORDER — TADALAFIL 5 MG/1
TABLET ORAL
Qty: 30 TABLET | Refills: 0 | Status: SHIPPED | OUTPATIENT
Start: 2020-10-13 | End: 2020-11-12

## 2020-10-13 NOTE — TELEPHONE ENCOUNTER
Pt stated that you had already reviewed his Ct scan and you had ordered him a u/s and the soonest appt for the u/s was on 10/22/2020. DO you want pt to have it sooner?  Please Advise     Future Appointments   Date Time Provider Aimee Banda

## 2020-10-13 NOTE — PROGRESS NOTES
HPI:    Patient ID: Ronald Melendez is a 39year old male.   For evaluation of abdominal pain  HPI  Patient reports about 2 weeks ago started to experience right upper quadrant pain which comes as attack very tight and intense squeezing sensation in the righ tablet 0     Allergies:  Flexeril [Cyclobenz*    UNKNOWN   /90 (BP Location: Left arm, Patient Position: Sitting, Cuff Size: adult)   Pulse 77   Resp 18   Wt 204 lb (92.5 kg)   BMI 27.67 kg/m²    Physical Exam    Constitutional: He is oriented to per

## 2020-10-13 NOTE — TELEPHONE ENCOUNTER
Spoke with pt,  verified  Pt informed of MD recommendation. Pt was given instruction to go to [facility] tomorrow ( 10/14) at 9 am, no food/ drink for 8 hours. Pt stated understanding. New stat order for u/s gallbladder placed, previous order cancelled.

## 2020-10-14 ENCOUNTER — TELEPHONE (OUTPATIENT)
Dept: INTERNAL MEDICINE CLINIC | Facility: CLINIC | Age: 45
End: 2020-10-14

## 2020-10-14 ENCOUNTER — HOSPITAL ENCOUNTER (OUTPATIENT)
Dept: ULTRASOUND IMAGING | Facility: HOSPITAL | Age: 45
Discharge: HOME | End: 2020-10-14
Attending: INTERNAL MEDICINE
Payer: COMMERCIAL

## 2020-10-14 DIAGNOSIS — R10.11 RIGHT UPPER QUADRANT ABDOMINAL PAIN: ICD-10-CM

## 2020-10-14 PROCEDURE — 76705 ECHO EXAM OF ABDOMEN: CPT | Performed by: INTERNAL MEDICINE

## 2020-10-14 NOTE — TELEPHONE ENCOUNTER
U/S department calling with stat u/s result. Pls see below. They are holding pt. Ok to let him go home? CONCLUSION:      Unremarkable sonographic appearance of gallbladder. Hepatic steatosis.

## 2020-10-14 NOTE — TELEPHONE ENCOUNTER
Patient contacted, accepted appt with Dr Ciara Campos 10/20, arrival 9:15am and given detailed directions to [facility]. Confirms [employer] PPO. Covid screen done.

## 2020-10-14 NOTE — TELEPHONE ENCOUNTER
Judson Head will do    GI staff:    Please add this patient to my schedule 9:30 AM next Tuesday 10/20 at the [facility]    Thanks    Patti Dickinson, [address] [place] [address] - Gastroenterology  10/13/2020  8:34 PM

## 2020-10-14 NOTE — TELEPHONE ENCOUNTER
Per  pt can go home and Dr. Braden Ball office will be calling him. Thanks   I called the u/s department and informed them of this information.

## 2020-10-14 NOTE — TELEPHONE ENCOUNTER
Spoke to patient, ultrasound did not show gallbladder stones, signs of fatty liver, advised weight loss, low-fat diet, no alcohol, patient continues to have morning vomiting, follows up bile type stomach content, advised to do HIDA scan and follow-up with gastroenterologist.  Also will send prescription for omeprazole

## 2020-10-20 ENCOUNTER — TELEPHONE (OUTPATIENT)
Dept: GASTROENTEROLOGY | Facility: CLINIC | Age: 45
End: 2020-10-20

## 2020-10-20 ENCOUNTER — OFFICE VISIT (OUTPATIENT)
Dept: GASTROENTEROLOGY | Facility: CLINIC | Age: 45
End: 2020-10-20
Payer: COMMERCIAL

## 2020-10-20 VITALS
SYSTOLIC BLOOD PRESSURE: 150 MMHG | HEIGHT: 72 IN | HEART RATE: 95 BPM | WEIGHT: 200 LBS | BODY MASS INDEX: 27.09 KG/M2 | DIASTOLIC BLOOD PRESSURE: 100 MMHG

## 2020-10-20 DIAGNOSIS — R63.4 WEIGHT LOSS: ICD-10-CM

## 2020-10-20 DIAGNOSIS — R19.7 DIARRHEA, UNSPECIFIED TYPE: ICD-10-CM

## 2020-10-20 DIAGNOSIS — R11.2 NON-INTRACTABLE VOMITING WITH NAUSEA, UNSPECIFIED VOMITING TYPE: ICD-10-CM

## 2020-10-20 DIAGNOSIS — R74.8 ELEVATED LIVER ENZYMES: ICD-10-CM

## 2020-10-20 DIAGNOSIS — R11.2 NON-INTRACTABLE VOMITING WITH NAUSEA: ICD-10-CM

## 2020-10-20 DIAGNOSIS — R16.0 HEPATOMEGALY: ICD-10-CM

## 2020-10-20 DIAGNOSIS — R10.9 RIGHT SIDED ABDOMINAL PAIN: Primary | ICD-10-CM

## 2020-10-20 PROCEDURE — 3080F DIAST BP >= 90 MM HG: CPT | Performed by: INTERNAL MEDICINE

## 2020-10-20 PROCEDURE — 3008F BODY MASS INDEX DOCD: CPT | Performed by: INTERNAL MEDICINE

## 2020-10-20 PROCEDURE — 99244 OFF/OP CNSLTJ NEW/EST MOD 40: CPT | Performed by: INTERNAL MEDICINE

## 2020-10-20 PROCEDURE — 3077F SYST BP >= 140 MM HG: CPT | Performed by: INTERNAL MEDICINE

## 2020-10-20 RX ORDER — ONDANSETRON 4 MG/1
4 TABLET, ORALLY DISINTEGRATING ORAL EVERY 8 HOURS PRN
Qty: 60 TABLET | Refills: 1 | Status: SHIPPED | OUTPATIENT
Start: 2020-10-20 | End: 2021-07-27

## 2020-10-20 NOTE — H&P
[facility] - Gastroenterology                                                                                                  Clinic History and Physical     Spinal fusion and rodding      Family Hx:   Family History   Problem Relation Age of Onset   • Hypertension Father         72   • Lipids Father 72        Hyperlipidemia      Social History: Social History    Tobacco Use      Smoking status: Carmelo Bullock remarkable for lipase 661, plts 103, , ALT 60. An ultrasound of the gallbladder 10/14 was noted as showing hepatic steatosis but otherwise unremarkable appearance of the gallbladder, bile duct and visualized pancreas.  A CT A/P 10/12 noted [facility]  10/20/2020

## 2020-10-20 NOTE — TELEPHONE ENCOUNTER
Scheduled for:  Colonoscopy [address] / EGD [address]  Provider Name:  Dr. Eddi Augustine  Date:  11/11/2020  Location:  [facility]  Sedation:  MAC  Time:  9:45 am, (pt is aware that [facility] will call the day before to confirm arrival time)  Prep:  Miralax/Gatorade  Meds/Allergi

## 2020-10-20 NOTE — PATIENT INSTRUCTIONS
1. Schedule colonoscopy and upper endoscopy with monitored anesthesia care (MAC) with Dr. Yoanna David at [facility], [facility], or [facility]    2.  bowel prep from pharmacy (split Miralax/Gatorade).  As we discussed it is important to take the bowel prepa

## 2020-10-21 ENCOUNTER — TELEPHONE (OUTPATIENT)
Dept: INTERNAL MEDICINE CLINIC | Facility: CLINIC | Age: 45
End: 2020-10-21

## 2020-10-21 NOTE — TELEPHONE ENCOUNTER
Patient states office visit with Dr. Amparo Ramos yesterday regarding right sided abdominal pain. Patient states he will be undergoing certain testing on Nov 11th. Patient reports due to his pain his blood pressure is \"out of control. \"  Yesterday in off

## 2020-10-21 NOTE — TELEPHONE ENCOUNTER
Spoke to patient, advised that he should take metoprolol ER twice a day as he used to take in the past, take Zofran first and take blood pressure medication 2 hours later hoping that he will not have nausea and vomiting cannot keep medication down reportin

## 2020-10-22 ENCOUNTER — TELEPHONE (OUTPATIENT)
Dept: GASTROENTEROLOGY | Facility: CLINIC | Age: 45
End: 2020-10-22

## 2020-10-22 NOTE — TELEPHONE ENCOUNTER
Pt contacted to clarify what documents he was requesting to . He states he misplaced his US order, lab order, AVS instructions, and prep instructions.      I reviewed all of the requested information was placed in an envelope at [facility] desk for h

## 2020-10-22 NOTE — TELEPHONE ENCOUNTER
Pt lost all the paperwork he was given with orders at his last visit - asking if he can  copies today

## 2020-10-22 NOTE — TELEPHONE ENCOUNTER
LM for patient is he requesting AVS summary from OV 10/20/2020? CLN/EGD prep instructions? Both? Does he want to  at [facility]  today? Does he have access to a fax?    -Awaiting pt c/b at this time.

## 2020-11-08 ENCOUNTER — LAB REQUISITION (OUTPATIENT)
Dept: LAB | Facility: HOSPITAL | Age: 45
End: 2020-11-08
Payer: COMMERCIAL

## 2020-11-08 DIAGNOSIS — Z01.818 ENCOUNTER FOR OTHER PREPROCEDURAL EXAMINATION: ICD-10-CM

## 2020-11-09 ENCOUNTER — LAB ENCOUNTER (OUTPATIENT)
Dept: LAB | Age: 45
End: 2020-11-09
Attending: INTERNAL MEDICINE
Payer: COMMERCIAL

## 2020-11-09 DIAGNOSIS — R10.9 RIGHT SIDED ABDOMINAL PAIN: ICD-10-CM

## 2020-11-09 DIAGNOSIS — R19.7 DIARRHEA, UNSPECIFIED TYPE: ICD-10-CM

## 2020-11-09 DIAGNOSIS — R16.0 HEPATOMEGALY: ICD-10-CM

## 2020-11-09 DIAGNOSIS — R63.4 WEIGHT LOSS: ICD-10-CM

## 2020-11-09 DIAGNOSIS — R11.2 NON-INTRACTABLE VOMITING WITH NAUSEA, UNSPECIFIED VOMITING TYPE: ICD-10-CM

## 2020-11-09 DIAGNOSIS — F41.9 ANXIETY: ICD-10-CM

## 2020-11-09 DIAGNOSIS — R74.8 ELEVATED LIVER ENZYMES: ICD-10-CM

## 2020-11-09 DIAGNOSIS — R10.11 RIGHT UPPER QUADRANT ABDOMINAL PAIN: ICD-10-CM

## 2020-11-09 PROCEDURE — 86704 HEP B CORE ANTIBODY TOTAL: CPT

## 2020-11-09 PROCEDURE — 82390 ASSAY OF CERULOPLASMIN: CPT

## 2020-11-09 PROCEDURE — 87340 HEPATITIS B SURFACE AG IA: CPT

## 2020-11-09 PROCEDURE — 80500 HEPATITIS A B + C PROFILE: CPT

## 2020-11-09 PROCEDURE — 84439 ASSAY OF FREE THYROXINE: CPT

## 2020-11-09 PROCEDURE — 86708 HEPATITIS A ANTIBODY: CPT

## 2020-11-09 PROCEDURE — 80053 COMPREHEN METABOLIC PANEL: CPT

## 2020-11-09 PROCEDURE — 86803 HEPATITIS C AB TEST: CPT

## 2020-11-09 PROCEDURE — 83036 HEMOGLOBIN GLYCOSYLATED A1C: CPT

## 2020-11-09 PROCEDURE — 84443 ASSAY THYROID STIM HORMONE: CPT

## 2020-11-09 PROCEDURE — 85025 COMPLETE CBC W/AUTO DIFF WBC: CPT

## 2020-11-09 PROCEDURE — 83516 IMMUNOASSAY NONANTIBODY: CPT

## 2020-11-09 PROCEDURE — 86706 HEP B SURFACE ANTIBODY: CPT

## 2020-11-09 PROCEDURE — 86256 FLUORESCENT ANTIBODY TITER: CPT

## 2020-11-09 PROCEDURE — 36415 COLL VENOUS BLD VENIPUNCTURE: CPT

## 2020-11-09 PROCEDURE — 82784 ASSAY IGA/IGD/IGG/IGM EACH: CPT

## 2020-11-11 ENCOUNTER — LAB REQUISITION (OUTPATIENT)
Dept: LAB | Facility: HOSPITAL | Age: 45
End: 2020-11-11
Payer: COMMERCIAL

## 2020-11-11 ENCOUNTER — SURGERY CENTER DOCUMENTATION (OUTPATIENT)
Dept: SURGERY | Age: 45
End: 2020-11-11

## 2020-11-11 ENCOUNTER — TELEPHONE (OUTPATIENT)
Dept: GASTROENTEROLOGY | Facility: CLINIC | Age: 45
End: 2020-11-11

## 2020-11-11 DIAGNOSIS — R19.7 DIARRHEA, UNSPECIFIED TYPE: ICD-10-CM

## 2020-11-11 DIAGNOSIS — K80.20 CALCULUS OF GALLBLADDER WITHOUT CHOLECYSTITIS WITHOUT OBSTRUCTION: ICD-10-CM

## 2020-11-11 DIAGNOSIS — R10.9 ABDOMINAL PAIN, UNSPECIFIED ABDOMINAL LOCATION: ICD-10-CM

## 2020-11-11 PROCEDURE — 45380 COLONOSCOPY AND BIOPSY: CPT | Performed by: INTERNAL MEDICINE

## 2020-11-11 PROCEDURE — 88312 SPECIAL STAINS GROUP 1: CPT | Performed by: INTERNAL MEDICINE

## 2020-11-11 PROCEDURE — 88305 TISSUE EXAM BY PATHOLOGIST: CPT | Performed by: INTERNAL MEDICINE

## 2020-11-11 PROCEDURE — 43239 EGD BIOPSY SINGLE/MULTIPLE: CPT | Performed by: INTERNAL MEDICINE

## 2020-11-11 NOTE — PROCEDURES
Esophagogastroduodenoscopy (EGD) & Colonoscopy Report    Rox Perez     1975 Age 39year old   PCP Mahendra Augustine MD Endoscopist Cassie John MD     Date of procedure: 20    Procedure: EGD w/ biopsy & Colonoscopy w/ biopsy    Pre-oper used for insufflation). The cecum was identified by localizing the trifold, the appendix and the ileocecal valve. Withdrawal was begun with thorough washing and careful examination of the colonic walls and folds.  The ascending colon was viewed twice in the forceps biopsies were obtained Otherwise, normal mucosa and vascular pattern    Rectum: retroflexed view showed small non-bleeding heomrrhoids. Otherwise, normal mucosa and vascular pattern.     Multiple cold forceps biopsies were obtained randomly througho

## 2020-11-12 NOTE — TELEPHONE ENCOUNTER
GI staff:    Please let him know that the blood tests do not suggest other causes for the liver issues we discussed in the office. There is no suggestion of an autoimmune cause, kaia's disease, thyroid issue, celiac disease, viral hepatitis.     Again, as

## 2020-11-13 ENCOUNTER — TELEPHONE (OUTPATIENT)
Dept: GASTROENTEROLOGY | Facility: CLINIC | Age: 45
End: 2020-11-13

## 2020-11-13 ENCOUNTER — MED REC SCAN ONLY (OUTPATIENT)
Dept: INTERNAL MEDICINE CLINIC | Facility: CLINIC | Age: 45
End: 2020-11-13

## 2020-11-13 RX ORDER — PANTOPRAZOLE SODIUM 40 MG/1
40 TABLET, DELAYED RELEASE ORAL NIGHTLY
Qty: 60 TABLET | Refills: 0 | Status: SHIPPED | OUTPATIENT
Start: 2020-11-13 | End: 2021-07-27

## 2020-11-13 NOTE — TELEPHONE ENCOUNTER
Entered into Epic:Recall EGD in 1 year per Dr. Jose M Mccabe. Last EGD done 11/11/2020, next due 11/11/2021. HM updated.

## 2020-11-13 NOTE — TELEPHONE ENCOUNTER
Spoke to pt and reviewed Dr. Madera Bearded message below. Pt verbalized understanding of all and will contact his PCP to arrange his Hep A/ Hep B vaccinations.

## 2020-11-13 NOTE — TELEPHONE ENCOUNTER
Entered into Epic:Recall colon in 10 years per Dr. Carey Jones.  Last Colon done 11/11/2020, next due 11/11/2030. HM updated. See other TE for EGD recall.

## 2020-11-17 ENCOUNTER — HOSPITAL ENCOUNTER (OUTPATIENT)
Dept: NUCLEAR MEDICINE | Facility: HOSPITAL | Age: 45
Discharge: HOME | End: 2020-11-17
Attending: INTERNAL MEDICINE
Payer: COMMERCIAL

## 2020-11-17 DIAGNOSIS — R10.11 RIGHT UPPER QUADRANT ABDOMINAL PAIN: ICD-10-CM

## 2020-11-17 PROCEDURE — 78227 HEPATOBIL SYST IMAGE W/DRUG: CPT | Performed by: INTERNAL MEDICINE

## 2020-11-17 RX ORDER — TADALAFIL 5 MG/1
5 TABLET ORAL DAILY
Qty: 30 TABLET | Refills: 0 | Status: SHIPPED | OUTPATIENT
Start: 2020-11-17 | End: 2020-11-18

## 2020-11-18 ENCOUNTER — TELEPHONE (OUTPATIENT)
Dept: GASTROENTEROLOGY | Facility: CLINIC | Age: 45
End: 2020-11-18

## 2020-11-18 RX ORDER — TADALAFIL 5 MG/1
TABLET ORAL
Qty: 30 TABLET | Refills: 1 | OUTPATIENT
Start: 2020-11-18

## 2020-11-18 RX ORDER — TADALAFIL 5 MG/1
5 TABLET ORAL DAILY
Qty: 90 TABLET | Refills: 0 | Status: SHIPPED | OUTPATIENT
Start: 2020-11-18 | End: 2021-02-21

## 2020-11-18 NOTE — TELEPHONE ENCOUNTER
Also see TE from 11/13/20 re: EGD results - patient requesting to speak with RN. Please call. Thank you.

## 2020-11-19 RX ORDER — PANTOPRAZOLE SODIUM 40 MG/1
40 TABLET, DELAYED RELEASE ORAL
Qty: 90 TABLET | Refills: 3 | Status: SHIPPED | OUTPATIENT
Start: 2020-11-19

## 2020-11-19 NOTE — TELEPHONE ENCOUNTER
90 Day Prescription Request    Current Outpatient Medications:     •  Pantoprazole Sodium 40 MG Oral Tab EC, Take 1 tablet (40 mg total) by mouth nightly., Disp: 60 tablet, Rfl: 0

## 2020-11-19 NOTE — TELEPHONE ENCOUNTER
Dr. Gay Rodarte-    Last OV-10/20/2020  Last CLN/EGD- 11/11/2020  Last Pantoprazole refill- 11/13/2020    If appropriate, please review and sign pended order, thank you.

## 2020-11-19 NOTE — TELEPHONE ENCOUNTER
Noted. Thanks    Jane Gasca MD  [facility], [facility] - Gastroenterology  11/19/2020  10:24 AM

## 2020-11-19 NOTE — TELEPHONE ENCOUNTER
Dr Ligia Gunderson, per your 11/13 conversation with patient, he is to f/u in [place]. I spoke to patient and spouse. Accepted appt at [facility] office Thurs Jan 14, arrival 1:15pm and given directions to office. Confirms [employer] PPO.  Patient had NM hepatobiliary sca

## 2020-11-20 ENCOUNTER — TELEPHONE (OUTPATIENT)
Dept: INTERNAL MEDICINE CLINIC | Facility: CLINIC | Age: 45
End: 2020-11-20

## 2020-11-20 RX ORDER — ALBUTEROL SULFATE 90 UG/1
2 AEROSOL, METERED RESPIRATORY (INHALATION) EVERY 4 HOURS PRN
Qty: 1 INHALER | Refills: 0 | Status: SHIPPED | OUTPATIENT
Start: 2020-11-20 | End: 2020-11-20 | Stop reason: CLARIF

## 2020-11-20 RX ORDER — AZITHROMYCIN 250 MG/1
TABLET, FILM COATED ORAL
Qty: 6 TABLET | Refills: 0 | Status: SHIPPED | OUTPATIENT
Start: 2020-11-20 | End: 2020-11-20 | Stop reason: CLARIF

## 2020-11-20 RX ORDER — BENZONATATE 200 MG/1
200 CAPSULE ORAL 3 TIMES DAILY PRN
Qty: 30 CAPSULE | Refills: 0 | Status: SHIPPED | OUTPATIENT
Start: 2020-11-20 | End: 2020-11-20 | Stop reason: CLARIF

## 2020-11-23 ENCOUNTER — TELEPHONE (OUTPATIENT)
Dept: INTERNAL MEDICINE CLINIC | Facility: CLINIC | Age: 45
End: 2020-11-23

## 2020-11-23 NOTE — TELEPHONE ENCOUNTER
Patient is scheduled for a consult with Dr. Monica Thakkar on 11/30 to discuss having his gallbladder removed. Patient would like to know if Dr. Jonn Spencer can help him get a sooner appointment. Please advise.

## 2020-11-23 NOTE — TELEPHONE ENCOUNTER
please advise called Dr. Victor Manuel Cadet office but they stated the earliest alfonzo they had for patient to be seen is in 11/30. Called patient and he states he would like to be seen earlier than 11/30 if possible. He would like to know which other provider would you recommend for him to go to get an earlier alfonzo.  Please advise

## 2020-11-24 RX ORDER — TADALAFIL 5 MG/1
TABLET ORAL
Qty: 30 TABLET | Refills: 1 | OUTPATIENT
Start: 2020-11-24

## 2020-11-30 ENCOUNTER — LAB ENCOUNTER (OUTPATIENT)
Dept: LAB | Facility: HOSPITAL | Age: 45
End: 2020-11-30
Attending: SURGERY
Payer: COMMERCIAL

## 2020-11-30 ENCOUNTER — TELEPHONE (OUTPATIENT)
Dept: INTERNAL MEDICINE CLINIC | Facility: CLINIC | Age: 45
End: 2020-11-30

## 2020-11-30 ENCOUNTER — OFFICE VISIT (OUTPATIENT)
Dept: SURGERY | Facility: CLINIC | Age: 45
End: 2020-11-30
Payer: COMMERCIAL

## 2020-11-30 ENCOUNTER — LAB REQUISITION (OUTPATIENT)
Dept: LAB | Facility: HOSPITAL | Age: 45
End: 2020-11-30
Payer: COMMERCIAL

## 2020-11-30 VITALS — BODY MASS INDEX: 27 KG/M2 | WEIGHT: 200 LBS

## 2020-11-30 DIAGNOSIS — K82.8 BILIARY DYSKINESIA: ICD-10-CM

## 2020-11-30 DIAGNOSIS — K81.1 CHRONIC CHOLECYSTITIS: Primary | ICD-10-CM

## 2020-11-30 DIAGNOSIS — K81.1 CHRONIC CHOLECYSTITIS: ICD-10-CM

## 2020-11-30 DIAGNOSIS — Z01.818 ENCOUNTER FOR OTHER PREPROCEDURAL EXAMINATION: ICD-10-CM

## 2020-11-30 PROCEDURE — 80076 HEPATIC FUNCTION PANEL: CPT

## 2020-11-30 PROCEDURE — 36415 COLL VENOUS BLD VENIPUNCTURE: CPT

## 2020-11-30 PROCEDURE — 99204 OFFICE O/P NEW MOD 45 MIN: CPT | Performed by: SURGERY

## 2020-11-30 PROCEDURE — 83690 ASSAY OF LIPASE: CPT

## 2020-11-30 RX ORDER — ONDANSETRON 8 MG/1
8 TABLET, ORALLY DISINTEGRATING ORAL EVERY 8 HOURS PRN
Qty: 12 TABLET | Refills: 0 | Status: SHIPPED | OUTPATIENT
Start: 2020-11-30 | End: 2021-07-27

## 2020-11-30 NOTE — TELEPHONE ENCOUNTER
Pt states has OV in one hour with Dr Ivette Malcolm.   Pt states he would prefer to go to the ER and be directly admitted \"Dr Mauricio Lazo already put an order in to have my gallbladder out\"    Advised can go to ER if pt has severe pain, and ER provider would assess an

## 2020-12-01 NOTE — TELEPHONE ENCOUNTER
I called [facility] in [facility] to which the tadalafil Rx was sent on 11/18/20. Per pharmacist, insurance will call #30 tab per 30 days or #90 tab per 90 days. Patient last filled Rx with [facility] on 9/21/2019 90 tablets.  He has not picked up the [facility]

## 2020-12-01 NOTE — H&P
History and Physical      Eboni Tobias is a 39year old male. HPI   Patient presents with:  Gallbladder: Patient complains of constant nausea/vomiting and diarrhea as well as right side abdominal pain for the past two months. Denies fevers.       HP TABLET BY MOUTH TWICE DAILY 180 tablet 0   • lisinopril 5 MG Oral Tab Take 1 tablet (5 mg total) by mouth daily.  90 tablet 0     ALLERGIES    Flexeril [Cyclobenz*    UNKNOWN    Social History    Socioeconomic History      Marital status:       Spous U/L 129High     Bilirubin, Total   0.1 - 2.0 mg/dL 1.0    Bilirubin, Direct   0.0 - 0.2 mg/dL 0.3High     Total Protein   6.4 - 8.2 g/dL 8.8High     Albumin   3.4 - 5.0 g/dL 4.0      PROCEDURE:  CT ABDOMEN + PELVIS (ALL CONTRAST ONLY)  INDICATIONS:  R10.11 Mild non-specific proximal abdominal/retroperitoneal lymphadenopathy. Consider a follow-up CT scan in 6 months.   5. Findings suggesting a previous umbilical herniorrhaphy with scarring posterior to the repair, or focal mild inflammation greater omental fa diagnosis)  Biliary dyskinesia    39year old male with somewhat atypical abdominal symptoms for two months, mostly RUQ pain and nausea and diarrhea.   Extensive GI workup including serology and colon/egd and CT and US and HIDA scan were fairly unremarkable

## 2020-12-02 ENCOUNTER — TELEPHONE (OUTPATIENT)
Dept: SURGERY | Facility: CLINIC | Age: 45
End: 2020-12-02

## 2020-12-02 NOTE — TELEPHONE ENCOUNTER
I spoke to pt, he states he has a neuro stimulator and usually has antibiotics prior to surgery, he wants to confirm non is needed. I discussed with Dr. Luther Bautista- antibiotics are not needed at this time, no sign of infection, can re-evaluate tomorrow.  Ch

## 2020-12-03 ENCOUNTER — LAB REQUISITION (OUTPATIENT)
Dept: LAB | Facility: HOSPITAL | Age: 45
End: 2020-12-03
Payer: COMMERCIAL

## 2020-12-03 ENCOUNTER — VIRTUAL CHECK-IN (OUTPATIENT)
Dept: SURGERY | Facility: CLINIC | Age: 45
End: 2020-12-03

## 2020-12-03 DIAGNOSIS — K80.20 CALCULUS OF GALLBLADDER WITHOUT CHOLECYSTITIS WITHOUT OBSTRUCTION: ICD-10-CM

## 2020-12-03 DIAGNOSIS — K82.8 BILIARY DYSKINESIA: Primary | ICD-10-CM

## 2020-12-03 PROCEDURE — 88307 TISSUE EXAM BY PATHOLOGIST: CPT | Performed by: SURGERY

## 2020-12-03 PROCEDURE — 88305 TISSUE EXAM BY PATHOLOGIST: CPT | Performed by: SURGERY

## 2020-12-03 PROCEDURE — 88304 TISSUE EXAM BY PATHOLOGIST: CPT | Performed by: SURGERY

## 2020-12-03 PROCEDURE — 88313 SPECIAL STAINS GROUP 2: CPT | Performed by: SURGERY

## 2020-12-03 RX ORDER — HYDROCODONE BITARTRATE AND ACETAMINOPHEN 5; 325 MG/1; MG/1
1 TABLET ORAL EVERY 6 HOURS PRN
Qty: 20 TABLET | Refills: 0 | Status: SHIPPED | OUTPATIENT
Start: 2020-12-03 | End: 2021-07-27

## 2020-12-07 ENCOUNTER — TELEPHONE (OUTPATIENT)
Dept: SURGERY | Facility: CLINIC | Age: 45
End: 2020-12-07

## 2020-12-07 NOTE — TELEPHONE ENCOUNTER
Per pt's spouse, wanting to know if it is okay to change appt type on 12/14/20 to in-clinic. Please advise to pt [phone].  Please advise

## 2020-12-08 ENCOUNTER — TELEPHONE (OUTPATIENT)
Dept: GASTROENTEROLOGY | Facility: CLINIC | Age: 45
End: 2020-12-08

## 2020-12-10 RX ORDER — TADALAFIL 5 MG/1
TABLET ORAL
Qty: 30 TABLET | Refills: 0 | OUTPATIENT
Start: 2020-12-10

## 2020-12-11 NOTE — TELEPHONE ENCOUNTER
Spoke with [facility]. Rx for tadalafil was not received on 11-. Pt. All [facility] up Rx monthly since May, insurance does not cover , pt. Used [facility].     Per NK ok to Authorized Tadalafil 5mg daily #90 no refill

## 2020-12-11 NOTE — TELEPHONE ENCOUNTER
Please check with pharmacy I sent tadalafil 5 mg on 11/18/2020 90 tablets, why we have another refill?

## 2020-12-14 ENCOUNTER — OFFICE VISIT (OUTPATIENT)
Dept: SURGERY | Facility: CLINIC | Age: 45
End: 2020-12-14
Payer: COMMERCIAL

## 2020-12-14 DIAGNOSIS — K81.1 CHRONIC CHOLECYSTITIS: Primary | ICD-10-CM

## 2020-12-14 DIAGNOSIS — R74.8 SERUM LIPASE ELEVATION: ICD-10-CM

## 2020-12-14 DIAGNOSIS — K76.0 HEPATIC STEATOSIS: ICD-10-CM

## 2020-12-14 DIAGNOSIS — K82.8 BILIARY DYSKINESIA: ICD-10-CM

## 2020-12-14 PROCEDURE — 99024 POSTOP FOLLOW-UP VISIT: CPT | Performed by: SURGERY

## 2020-12-14 NOTE — PROGRESS NOTES
Postoperative Patient Follow-up      12/14/2020    Yudelka Quiroga 39year old      HPI  Patient presents with:  Post-Op: Pt here for 1st post op s/p sharon cornejo on 12/3/2020. Pt states his appetite has returned. Pt denies fever, dif. w/ bm's or urination.

## 2020-12-17 ENCOUNTER — TELEPHONE (OUTPATIENT)
Dept: SURGERY | Facility: CLINIC | Age: 45
End: 2020-12-17

## 2020-12-17 NOTE — TELEPHONE ENCOUNTER
Patient requesting to speak with nurse regarding an incontinent episode of stool after surgery. Please call at:[phone],thanks.

## 2020-12-17 NOTE — TELEPHONE ENCOUNTER
Spoke to patient. He had an incident of incontinence of stool last night after eating home made spaghetti. He states that it was a single incidence, and he has been feeling great since the surgery. He denies recent diarrhea.  He states that he had indigesti

## 2021-01-14 ENCOUNTER — TELEPHONE (OUTPATIENT)
Dept: INTERNAL MEDICINE CLINIC | Facility: CLINIC | Age: 46
End: 2021-01-14

## 2021-01-14 NOTE — TELEPHONE ENCOUNTER
Patient returned call. He is currently up in [place]. He states he ate a lot of crab legs and steak soaked in whiskey and know his right foot is swollen and painful. He is pretty sure it is gout. He cannot come in for an exam because he wont' be back until Sunday. I cannot book a virtual appt because pt needs to be in the state of [place]. I suggested patient be seen in an urgent care or immediate care in [place]. He agreed to this plan of care and will go today. MARCIO Sanches.

## 2021-01-14 NOTE — TELEPHONE ENCOUNTER
Received link message from Dr. Nissa Montero. Agrees with triage advice. States patient should be seen when he returns from [place] and after UC visit. Left message to call back.

## 2021-01-14 NOTE — TELEPHONE ENCOUNTER
Left message for the patient that I cannot send his medication for gout to the pharmacy, I never treated him for gout, I need to speak to him, the best to have visit tomorrow or at least video visit, couple slots available for that. Allopurinol can be prescribed in certain situation if he has acute flareup of the gout it is not appropriate medication, so he needs evaluation.

## 2021-01-16 ENCOUNTER — HOSPITAL ENCOUNTER (OUTPATIENT)
Age: 46
Discharge: HOME | End: 2021-01-16
Payer: COMMERCIAL

## 2021-01-16 VITALS
TEMPERATURE: 99 F | OXYGEN SATURATION: 97 % | DIASTOLIC BLOOD PRESSURE: 98 MMHG | HEART RATE: 91 BPM | RESPIRATION RATE: 20 BRPM | SYSTOLIC BLOOD PRESSURE: 138 MMHG

## 2021-01-16 DIAGNOSIS — M10.9 ACUTE GOUT INVOLVING TOE OF LEFT FOOT, UNSPECIFIED CAUSE: Primary | ICD-10-CM

## 2021-01-16 PROCEDURE — 99213 OFFICE O/P EST LOW 20 MIN: CPT | Performed by: NURSE PRACTITIONER

## 2021-01-16 PROCEDURE — 93000 ELECTROCARDIOGRAM COMPLETE: CPT | Performed by: NURSE PRACTITIONER

## 2021-01-16 RX ORDER — IBUPROFEN 600 MG/1
600 TABLET ORAL ONCE
Status: COMPLETED | OUTPATIENT
Start: 2021-01-16 | End: 2021-01-16

## 2021-01-16 RX ORDER — ALLOPURINOL 100 MG/1
100 TABLET ORAL DAILY
Qty: 14 TABLET | Refills: 0 | Status: SHIPPED | OUTPATIENT
Start: 2021-01-16 | End: 2021-01-30

## 2021-01-16 RX ORDER — TRAMADOL HYDROCHLORIDE 50 MG/1
50 TABLET ORAL EVERY 6 HOURS PRN
Qty: 12 TABLET | Refills: 0 | Status: SHIPPED | OUTPATIENT
Start: 2021-01-16 | End: 2021-01-20

## 2021-01-16 NOTE — ED PROVIDER NOTES
Patient Seen in: [facility] [place]      History   Patient presents with:  Swelling Edema    Stated Complaint: TL-Gout flare up    HPI/Subjective:   40 y/o male presents with c/o left foot/toe pain for the past 2 days. Pt denies trauma to foot.  Pt de All other systems reviewed and negative except as noted above.     Physical Exam     ED Triage Vitals [01/16/21 0848]   BP (!) 137/117   Pulse (!) 140   Resp 20   Temp 98.6 °F (37 °C)   Temp src    SpO2 97 %   O2 Device None (Room air)       Current:BP (!) Pt denies a history of BP, although pt was prescribed metoprolol and lisinopril in Jan 2020, no additional refills. Pt denies CP or SOB.    Improved bp- 137/117 -->138/98  Improved ht rate- 140--> 91    Discussed with pt that he should go to ED for pain c

## 2021-02-04 ENCOUNTER — OFFICE VISIT (OUTPATIENT)
Dept: NEUROSURGERY | Age: 46
End: 2021-02-04

## 2021-02-04 ENCOUNTER — IMAGING SERVICES (OUTPATIENT)
Dept: GENERAL RADIOLOGY | Age: 46
End: 2021-02-04

## 2021-02-04 ENCOUNTER — TELEPHONE (OUTPATIENT)
Dept: NEUROSURGERY | Age: 46
End: 2021-02-04

## 2021-02-04 VITALS
WEIGHT: 215 LBS | BODY MASS INDEX: 29.12 KG/M2 | DIASTOLIC BLOOD PRESSURE: 122 MMHG | SYSTOLIC BLOOD PRESSURE: 154 MMHG | HEIGHT: 72 IN | RESPIRATION RATE: 16 BRPM | HEART RATE: 100 BPM

## 2021-02-04 DIAGNOSIS — M54.50 ACUTE BILATERAL LOW BACK PAIN WITHOUT SCIATICA: ICD-10-CM

## 2021-02-04 DIAGNOSIS — Z98.1 HISTORY OF LUMBAR FUSION: Primary | ICD-10-CM

## 2021-02-04 DIAGNOSIS — Z98.1 HISTORY OF LUMBAR FUSION: ICD-10-CM

## 2021-02-04 PROCEDURE — 72110 X-RAY EXAM L-2 SPINE 4/>VWS: CPT | Performed by: PHYSICIAN ASSISTANT

## 2021-02-04 PROCEDURE — 99213 OFFICE O/P EST LOW 20 MIN: CPT | Performed by: PHYSICIAN ASSISTANT

## 2021-02-04 RX ORDER — METHYLPREDNISOLONE 4 MG
TABLET, DOSE PACK ORAL
Qty: 21 TABLET | Refills: 0 | Status: SHIPPED | OUTPATIENT
Start: 2021-02-04

## 2021-02-04 RX ORDER — METHOCARBAMOL 750 MG/1
750 TABLET, FILM COATED ORAL 4 TIMES DAILY PRN
Qty: 60 TABLET | Refills: 0 | Status: SHIPPED | OUTPATIENT
Start: 2021-02-04

## 2021-02-04 RX ORDER — METOPROLOL SUCCINATE 25 MG/1
1 TABLET, EXTENDED RELEASE ORAL DAILY
COMMUNITY
Start: 2020-01-16

## 2021-02-04 ASSESSMENT — ENCOUNTER SYMPTOMS
WEAKNESS: 0
LIGHT-HEADEDNESS: 0
TREMORS: 0
DIZZINESS: 0
CONSTITUTIONAL NEGATIVE: 1
PSYCHIATRIC NEGATIVE: 1
SPEECH DIFFICULTY: 0
HEADACHES: 0
BACK PAIN: 1
NUMBNESS: 0
FACIAL ASYMMETRY: 0
SEIZURES: 0

## 2021-02-04 ASSESSMENT — VISUAL ACUITY: VA_NORMAL: 1

## 2021-02-21 RX ORDER — TADALAFIL 5 MG/1
TABLET ORAL
Qty: 90 TABLET | Refills: 0 | Status: SHIPPED | OUTPATIENT
Start: 2021-02-21 | End: 2021-05-12

## 2021-02-25 ENCOUNTER — TELEPHONE (OUTPATIENT)
Dept: NEUROSURGERY | Age: 46
End: 2021-02-25

## 2021-02-25 DIAGNOSIS — M54.50 ACUTE BILATERAL LOW BACK PAIN WITHOUT SCIATICA: Primary | ICD-10-CM

## 2021-05-02 ENCOUNTER — HOSPITAL ENCOUNTER (OUTPATIENT)
Age: 46
Discharge: HOME | End: 2021-05-02
Payer: COMMERCIAL

## 2021-05-02 VITALS
OXYGEN SATURATION: 98 % | HEIGHT: 72 IN | DIASTOLIC BLOOD PRESSURE: 113 MMHG | HEART RATE: 98 BPM | SYSTOLIC BLOOD PRESSURE: 133 MMHG | WEIGHT: 200 LBS | RESPIRATION RATE: 18 BRPM | TEMPERATURE: 98 F | BODY MASS INDEX: 27.09 KG/M2

## 2021-05-02 DIAGNOSIS — J01.10 ACUTE NON-RECURRENT FRONTAL SINUSITIS: ICD-10-CM

## 2021-05-02 DIAGNOSIS — Z20.822 ENCOUNTER FOR LABORATORY TESTING FOR COVID-19 VIRUS: Primary | ICD-10-CM

## 2021-05-02 PROCEDURE — 99213 OFFICE O/P EST LOW 20 MIN: CPT | Performed by: NURSE PRACTITIONER

## 2021-05-02 PROCEDURE — U0002 COVID-19 LAB TEST NON-CDC: HCPCS | Performed by: NURSE PRACTITIONER

## 2021-05-02 RX ORDER — AMOXICILLIN AND CLAVULANATE POTASSIUM 875; 125 MG/1; MG/1
1 TABLET, FILM COATED ORAL 2 TIMES DAILY
Qty: 20 TABLET | Refills: 0 | Status: SHIPPED | OUTPATIENT
Start: 2021-05-02 | End: 2021-05-12

## 2021-05-02 NOTE — ED INITIAL ASSESSMENT (HPI)
Stuffy nose puffy eyes nasal congestion for 9 days no fever denies covid exposure has has both vaccines 2 weeks ago.

## 2021-05-02 NOTE — ED PROVIDER NOTES
Patient Seen in: [facility] [place]      History   Patient presents with:  Covid-19 Test  Cough/URI  Sinus Problem    Stated Complaint: sinus infection, trouble breathing , congestion x 9 days    HPI/Subjective:   HPI    49-year-old male with a histo Exam    Adult physical exam:     VS: Vital signs reviewed. O2 saturation within normal limits for this patient     General: Patient is awake and alert, oriented to person, place and time. Not in acute distress. HEENT: Head is normocephalic atraumatic. with sinus pressure and green rhinorrhea  for a covid test. Nontoxic, does not meet SIRS criteria. Patient does not have uvula deviation or unilateral tonsillar swelling to indicate tonsillar abscess. No meningsmus or trismus.  No dysphagia or difficulty

## 2021-05-12 RX ORDER — TADALAFIL 5 MG/1
TABLET ORAL
Qty: 90 TABLET | Refills: 0 | Status: SHIPPED | OUTPATIENT
Start: 2021-05-12 | End: 2021-07-28

## 2021-07-23 ENCOUNTER — TELEPHONE (OUTPATIENT)
Dept: INTERNAL MEDICINE CLINIC | Facility: CLINIC | Age: 46
End: 2021-07-23

## 2021-07-23 DIAGNOSIS — Z20.822 ENCOUNTER FOR LABORATORY TESTING FOR COVID-19 VIRUS: Primary | ICD-10-CM

## 2021-07-23 NOTE — TELEPHONE ENCOUNTER
Spoke with patient--requesting in office appt with Dr. Gopal Yeung for recurrent sinusitis symptoms--was seen at [facility] and given abx, but symptoms returned. Offered virtual visit today with another provider--patient declines.  Ordered Jyotsna Covid test per resp

## 2021-07-27 ENCOUNTER — TELEMEDICINE (OUTPATIENT)
Dept: INTERNAL MEDICINE CLINIC | Facility: CLINIC | Age: 46
End: 2021-07-27
Payer: COMMERCIAL

## 2021-07-27 DIAGNOSIS — R23.8 EASY BRUISING: ICD-10-CM

## 2021-07-27 DIAGNOSIS — R63.4 WEIGHT LOSS: Primary | ICD-10-CM

## 2021-07-27 PROBLEM — I10 ESSENTIAL HYPERTENSION: Status: RESOLVED | Noted: 2019-03-31 | Resolved: 2021-07-27

## 2021-07-27 PROCEDURE — 99214 OFFICE O/P EST MOD 30 MIN: CPT | Performed by: INTERNAL MEDICINE

## 2021-07-27 RX ORDER — PANTOPRAZOLE SODIUM 40 MG/1
40 TABLET, DELAYED RELEASE ORAL
Qty: 90 TABLET | Refills: 0 | Status: SHIPPED | OUTPATIENT
Start: 2021-07-27 | End: 2021-08-17

## 2021-07-27 RX ORDER — CEFDINIR 300 MG/1
300 CAPSULE ORAL 2 TIMES DAILY
Qty: 14 CAPSULE | Refills: 0 | Status: SHIPPED | OUTPATIENT
Start: 2021-07-27 | End: 2021-08-13 | Stop reason: ALTCHOICE

## 2021-07-28 RX ORDER — TADALAFIL 5 MG/1
5 TABLET ORAL DAILY
Qty: 90 TABLET | Refills: 0 | Status: SHIPPED | OUTPATIENT
Start: 2021-07-28 | End: 2021-11-01

## 2021-07-28 NOTE — PROGRESS NOTES
Virtual Telephone Check-In    Soto Martinez verbally consents to a Virtual/Telephone Check-In visit on 7/27/2021      Patient understands and accepts financial responsibility for any deductible, co-insurance and/or co-pays associated with this service. taking: Reported on 5/2/2021 ) 12 tablet 0   • Pantoprazole Sodium 40 MG Oral Tab EC Take 1 tablet (40 mg total) by mouth every morning before breakfast. 90 tablet 3   • Pantoprazole Sodium 40 MG Oral Tab EC Take 1 tablet (40 mg total) by mouth nightly.  61 Imaging & Referrals:  None         Johnathan Evangelista understands telephone evaluation is not a substitute for face-to-face examination or emergency care. Patient advised to go to ER or call 911 for worsening symptoms or acute distress.      Frederick Birmingham,

## 2021-07-28 NOTE — TELEPHONE ENCOUNTER
Refill passed per [facility], [facility] protocol.   Requested Prescriptions   Pending Prescriptions Disp Refills    TADALAFIL 5 MG Oral Tab [Pharmacy Med Name: TADALAFIL 5 MG TABLET] 90 tablet 0     Sig: TAKE ONE TABLET BY MOUTH DAILY        Genitourinary Medicat

## 2021-07-31 ENCOUNTER — LAB ENCOUNTER (OUTPATIENT)
Dept: LAB | Age: 46
End: 2021-07-31
Attending: INTERNAL MEDICINE
Payer: COMMERCIAL

## 2021-07-31 DIAGNOSIS — R23.8 EASY BRUISING: ICD-10-CM

## 2021-07-31 DIAGNOSIS — R63.4 WEIGHT LOSS: ICD-10-CM

## 2021-07-31 LAB
ALBUMIN SERPL-MCNC: 3.4 G/DL (ref 3.4–5)
ALBUMIN/GLOB SERPL: 0.7 {RATIO} (ref 1–2)
ALP LIVER SERPL-CCNC: 186 U/L
ALT SERPL-CCNC: 108 U/L
ANION GAP SERPL CALC-SCNC: 8 MMOL/L (ref 0–18)
APTT PPP: 34.3 SECONDS (ref 23.2–35.3)
AST SERPL-CCNC: 239 U/L (ref 15–37)
BASOPHILS # BLD AUTO: 0.02 X10(3) UL (ref 0–0.2)
BASOPHILS NFR BLD AUTO: 0.7 %
BILIRUB SERPL-MCNC: 1.6 MG/DL (ref 0.1–2)
BUN BLD-MCNC: 7 MG/DL (ref 7–18)
BUN/CREAT SERPL: 8.5 (ref 10–20)
CALCIUM BLD-MCNC: 8.9 MG/DL (ref 8.5–10.1)
CHLORIDE SERPL-SCNC: 96 MMOL/L (ref 98–112)
CO2 SERPL-SCNC: 28 MMOL/L (ref 21–32)
CREAT BLD-MCNC: 0.82 MG/DL
DEPRECATED RDW RBC AUTO: 44.7 FL (ref 35.1–46.3)
EOSINOPHIL # BLD AUTO: 0.01 X10(3) UL (ref 0–0.7)
EOSINOPHIL NFR BLD AUTO: 0.3 %
ERYTHROCYTE [DISTWIDTH] IN BLOOD BY AUTOMATED COUNT: 12.1 % (ref 11–15)
GLOBULIN PLAS-MCNC: 4.8 G/DL (ref 2.8–4.4)
GLUCOSE BLD-MCNC: 116 MG/DL (ref 70–99)
HCT VFR BLD AUTO: 35.6 %
HGB BLD-MCNC: 13.1 G/DL
IMM GRANULOCYTES # BLD AUTO: 0 X10(3) UL (ref 0–1)
IMM GRANULOCYTES NFR BLD: 0 %
INR BLD: 1.09 (ref 0.9–1.2)
LYMPHOCYTES # BLD AUTO: 0.6 X10(3) UL (ref 1–4)
LYMPHOCYTES NFR BLD AUTO: 19.9 %
M PROTEIN MFR SERPL ELPH: 8.2 G/DL (ref 6.4–8.2)
MCH RBC QN AUTO: 36.9 PG (ref 26–34)
MCHC RBC AUTO-ENTMCNC: 36.8 G/DL (ref 31–37)
MCV RBC AUTO: 100.3 FL
MONOCYTES # BLD AUTO: 0.52 X10(3) UL (ref 0.1–1)
MONOCYTES NFR BLD AUTO: 17.2 %
NEUTROPHILS # BLD AUTO: 1.87 X10 (3) UL (ref 1.5–7.7)
NEUTROPHILS # BLD AUTO: 1.87 X10(3) UL (ref 1.5–7.7)
NEUTROPHILS NFR BLD AUTO: 61.9 %
OSMOLALITY SERPL CALC.SUM OF ELEC: 273 MOSM/KG (ref 275–295)
PATIENT FASTING Y/N/NP: YES
PLATELET # BLD AUTO: 66 10(3)UL (ref 150–450)
POTASSIUM SERPL-SCNC: 3.2 MMOL/L (ref 3.5–5.1)
PROTHROMBIN TIME: 13.9 SECONDS (ref 11.8–14.5)
RBC # BLD AUTO: 3.55 X10(6)UL
SODIUM SERPL-SCNC: 132 MMOL/L (ref 136–145)
TSI SER-ACNC: 1.53 MIU/ML (ref 0.36–3.74)
WBC # BLD AUTO: 3 X10(3) UL (ref 4–11)

## 2021-07-31 PROCEDURE — 84443 ASSAY THYROID STIM HORMONE: CPT

## 2021-07-31 PROCEDURE — 85610 PROTHROMBIN TIME: CPT

## 2021-07-31 PROCEDURE — 36415 COLL VENOUS BLD VENIPUNCTURE: CPT

## 2021-07-31 PROCEDURE — 85730 THROMBOPLASTIN TIME PARTIAL: CPT

## 2021-07-31 PROCEDURE — 85025 COMPLETE CBC W/AUTO DIFF WBC: CPT

## 2021-07-31 PROCEDURE — 80053 COMPREHEN METABOLIC PANEL: CPT

## 2021-08-02 ENCOUNTER — OFFICE VISIT (OUTPATIENT)
Dept: SURGERY | Facility: CLINIC | Age: 46
End: 2021-08-02
Payer: COMMERCIAL

## 2021-08-02 VITALS — HEIGHT: 72 IN | WEIGHT: 195 LBS | BODY MASS INDEX: 26.41 KG/M2

## 2021-08-02 DIAGNOSIS — R79.89 ABNORMAL LFTS: ICD-10-CM

## 2021-08-02 DIAGNOSIS — K43.2 INCISIONAL HERNIA WITHOUT OBSTRUCTION OR GANGRENE: Primary | ICD-10-CM

## 2021-08-02 PROCEDURE — 3008F BODY MASS INDEX DOCD: CPT | Performed by: SURGERY

## 2021-08-02 PROCEDURE — 99214 OFFICE O/P EST MOD 30 MIN: CPT | Performed by: SURGERY

## 2021-08-03 NOTE — H&P
History and Physical      Tona Roa is a 39year old male. HPI   Patient presents with:  Referral: Referral from Dr. Rosa Stack for possible Umbilical Hernia. Patient reports pain with activity in the Umbilical area. Onset about two months. ALLERGIES    Flexeril [Cyclobenz*    UNKNOWN    Social History    Socioeconomic History      Marital status:       Spouse name: Not on file      Number of children: 2      Years of education: Not on file      Highest education level: Not on file patent. BILIARY: No evidence for cholecystitis or biliary dilatation. SPLEEN: Splenomegaly with craniocaudal dimension of 16.5 cm.    PANCREAS: Normal.     ADRENALS: Normal.   KIDNEYS: Normal.   AORTA/VASCULAR: Normal.  No aneurysm or dissection.     LY posteriorly at T12-L1.   10/12/2020     Component   Ref Range & Units 7/31/21 10:25 AM   Glucose   70 - 99 mg/dL 116High     Sodium   136 - 145 mmol/L 132Low     Potassium   3.5 - 5.1 mmol/L 3.2Low     Chloride   98 - 112 mmol/L 96Low     CO2   21.0 - 32. 0 findings are concerning for cirrhosis and hypersplenism. Discussed in detail with patient and spouse, options reviewed. He will benefit from repair - lap w/ mesh, and understands risks.  He will need medical and GI eval preop - consider liver elastography

## 2021-08-04 ENCOUNTER — TELEPHONE (OUTPATIENT)
Dept: INTERNAL MEDICINE CLINIC | Facility: CLINIC | Age: 46
End: 2021-08-04

## 2021-08-04 ENCOUNTER — TELEPHONE (OUTPATIENT)
Dept: SURGERY | Facility: CLINIC | Age: 46
End: 2021-08-04

## 2021-08-04 DIAGNOSIS — D69.6 THROMBOCYTOPENIA (HCC): Primary | ICD-10-CM

## 2021-08-05 ENCOUNTER — TELEPHONE (OUTPATIENT)
Dept: SURGERY | Facility: CLINIC | Age: 46
End: 2021-08-05

## 2021-08-05 ENCOUNTER — TELEPHONE (OUTPATIENT)
Dept: INTERNAL MEDICINE CLINIC | Facility: CLINIC | Age: 46
End: 2021-08-05

## 2021-08-05 ENCOUNTER — TELEPHONE (OUTPATIENT)
Dept: GASTROENTEROLOGY | Facility: CLINIC | Age: 46
End: 2021-08-05

## 2021-08-05 NOTE — TELEPHONE ENCOUNTER
Spoke to patient, advised that he cannot have surgery until he gets evaluation for low platelet count elevated liver enzymes.   He stated that he knows after seeing Dr. Janusz Duran today and agreed with the plan, advised that I will try to facilitate appointments with gastroenterology and hematology

## 2021-08-05 NOTE — TELEPHONE ENCOUNTER
Patient is scheduled for a hernia surgery with Dr. Ana Altman on 8/24. Patient is requesting a pre-op appointment for 8/9 or 8/10. Please advise. No appointments are available.  Thank you

## 2021-08-05 NOTE — TELEPHONE ENCOUNTER
Dr. Neo Campos,    Fax received from Dr. Marina Peñaloza stating patient is scheduled for a Laparoscopic Assisted Incisional Hernia Repair with Mesh on August 24th. They are seeking GI evaluation prior to procedure. No open appts before procedure date.      Angelo

## 2021-08-05 NOTE — TELEPHONE ENCOUNTER
Can you see this patient soon, low platelets, low leukocytes, elevated liver enzyme most likely alcohol associated liver disease but I would like you to evaluate him as well

## 2021-08-06 NOTE — TELEPHONE ENCOUNTER
Stefan Laws and Sandra Rouse,    I can have my staff call him to see him soon, but I agree with what appears to be where you've already directed him which is to a hepatologist prior to surgery given what I told him last year which was my concern for cirrhosis    Thanks    [facility]

## 2021-08-06 NOTE — TELEPHONE ENCOUNTER
I can see him 11 AM [facility] 8/17    Thanks    Francisca Andrews MD  [facility], [facility] - Gastroenterology  8/6/2021  8:59 AM

## 2021-08-06 NOTE — TELEPHONE ENCOUNTER
Contacted patient and confirmed appointment for 21 at [address]. Location details provided, patient verbalized understanding.      Your appointments     Date & Time Appointment Department [facility])    Aug 17, 2021 11:00 AM CDT Follow Up Visit with Tilda Apgar

## 2021-08-09 ENCOUNTER — TELEPHONE (OUTPATIENT)
Dept: HEMATOLOGY/ONCOLOGY | Facility: HOSPITAL | Age: 46
End: 2021-08-09

## 2021-08-09 NOTE — TELEPHONE ENCOUNTER
[facility] for Consult appt with Dr. Loraine Martinez for Low Platelets ASAP for 3/12/38 at 330 PM-430PM or 8/13 at 1130 AM-1230 PM per [facility], Referring Dr. Cuba López.

## 2021-08-10 ENCOUNTER — TELEPHONE (OUTPATIENT)
Dept: INTERNAL MEDICINE CLINIC | Facility: CLINIC | Age: 46
End: 2021-08-10

## 2021-08-10 NOTE — TELEPHONE ENCOUNTER
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 Pt stated  knows about the condition. Please advise.

## 2021-08-13 ENCOUNTER — OFFICE VISIT (OUTPATIENT)
Dept: HEMATOLOGY/ONCOLOGY | Facility: HOSPITAL | Age: 46
End: 2021-08-13
Attending: INTERNAL MEDICINE
Payer: COMMERCIAL

## 2021-08-13 VITALS
TEMPERATURE: 98 F | DIASTOLIC BLOOD PRESSURE: 97 MMHG | WEIGHT: 193 LBS | OXYGEN SATURATION: 100 % | BODY MASS INDEX: 26.14 KG/M2 | RESPIRATION RATE: 18 BRPM | HEIGHT: 72 IN | HEART RATE: 108 BPM | SYSTOLIC BLOOD PRESSURE: 145 MMHG

## 2021-08-13 DIAGNOSIS — R59.0 RETROPERITONEAL LYMPHADENOPATHY: ICD-10-CM

## 2021-08-13 DIAGNOSIS — D53.9 MACROCYTIC ANEMIA: ICD-10-CM

## 2021-08-13 DIAGNOSIS — D69.6 THROMBOCYTOPENIA (HCC): Primary | ICD-10-CM

## 2021-08-13 PROCEDURE — 99245 OFF/OP CONSLTJ NEW/EST HI 55: CPT | Performed by: INTERNAL MEDICINE

## 2021-08-13 NOTE — PROGRESS NOTES
Hematology Consultation Note    Patient Name: Nani Garces   YOB: 1975   Medical Record Number: [id number]   CSN: [id number]   Consulting Physician: Linda Santana MD  Referring Physician(s): Brigitte Amaya  Date of Consultation: 8/13/2021 REMOVAL Right 2009    flank   • LUMBAR SPINE FUSION COMBINED  12/2010   • NEEDLE BIOPSY LIVER  12/03/2020   • STEREOTACT STIM SPINAL CORD  2013    stimulator placed for pain management   • UMBILICAL HERNIA REPAIR  07/27/2011    w/ mesh       [facility] Medical problems, gait problems and weakness. A comprehensive 14 point review of systems was completed. Pertinent positives and negatives noted in the the HPI.      Vital Signs:  BP (!) 145/97 (BP Location: Left arm, Patient Position: Sitting, Cuff Size: large) abdomen/pelvis 10/12/2020    Impression   CONCLUSION:   1. No acute finding to account for the right upper quadrant abdominal pain. 2. Hepatomegaly and mild hepatic steatosis. 3. Moderate splenomegaly.    4. Mild non-specific proximal abdominal/retroper thrombocytopenia    --I reviewed that platelets greater than 50,000 are typically appropriate for general surgery procedures    --Discussed with pt and his wife, that heavy regular alcohol use could be suppressing his bone marrow causing impaired productio noted to have some nonspecific proximal abdominal retroperitoneal lymphadenopathy (1.6 x 2.0 aortocaval node and 1.0 x 1.5 cm left para-aortic lymph node)with recommendations for consideration of repeat CT scan in 6 months    --I would like for him to repe

## 2021-08-14 ENCOUNTER — LAB ENCOUNTER (OUTPATIENT)
Dept: LAB | Facility: HOSPITAL | Age: 46
End: 2021-08-14
Attending: INTERNAL MEDICINE
Payer: COMMERCIAL

## 2021-08-14 DIAGNOSIS — D69.6 THROMBOCYTOPENIA (HCC): ICD-10-CM

## 2021-08-14 DIAGNOSIS — D53.9 MACROCYTIC ANEMIA: ICD-10-CM

## 2021-08-14 LAB
HAPTOGLOB SERPL-MCNC: 100 MG/DL (ref 30–200)
HGB RETIC QN AUTO: 44.5 PG (ref 28.2–36.6)
IMM RETICS NFR: 0.11 RATIO (ref 0.1–0.3)
IRON SATURATION: 60 %
IRON SERPL-MCNC: 179 UG/DL
LDH SERPL L TO P-CCNC: 205 U/L
RETICS # AUTO: 75 X10(3) UL (ref 22.5–147.5)
RETICS/RBC NFR AUTO: 2.2 %
TOTAL IRON BINDING CAPACITY: 298 UG/DL (ref 240–450)
TRANSFERRIN SERPL-MCNC: 200 MG/DL (ref 200–360)

## 2021-08-14 PROCEDURE — 83540 ASSAY OF IRON: CPT

## 2021-08-14 PROCEDURE — 83615 LACTATE (LD) (LDH) ENZYME: CPT

## 2021-08-14 PROCEDURE — 83010 ASSAY OF HAPTOGLOBIN QUANT: CPT

## 2021-08-14 PROCEDURE — 85060 BLOOD SMEAR INTERPRETATION: CPT

## 2021-08-14 PROCEDURE — 85025 COMPLETE CBC W/AUTO DIFF WBC: CPT

## 2021-08-14 PROCEDURE — 85045 AUTOMATED RETICULOCYTE COUNT: CPT

## 2021-08-14 PROCEDURE — 36415 COLL VENOUS BLD VENIPUNCTURE: CPT

## 2021-08-14 PROCEDURE — 84466 ASSAY OF TRANSFERRIN: CPT

## 2021-08-16 ENCOUNTER — TELEPHONE (OUTPATIENT)
Dept: HEMATOLOGY/ONCOLOGY | Facility: HOSPITAL | Age: 46
End: 2021-08-16

## 2021-08-16 LAB
BASOPHILS # BLD AUTO: 0.01 X10(3) UL (ref 0–0.2)
BASOPHILS NFR BLD AUTO: 0.3 %
DEPRECATED RDW RBC AUTO: 51 FL (ref 35.1–46.3)
EOSINOPHIL # BLD AUTO: 0.05 X10(3) UL (ref 0–0.7)
EOSINOPHIL NFR BLD AUTO: 1.6 %
ERYTHROCYTE [DISTWIDTH] IN BLOOD BY AUTOMATED COUNT: 13.3 % (ref 11–15)
HCT VFR BLD AUTO: 36.9 %
HGB BLD-MCNC: 13 G/DL
IMM GRANULOCYTES # BLD AUTO: 0 X10(3) UL (ref 0–1)
IMM GRANULOCYTES NFR BLD: 0 %
LYMPHOCYTES # BLD AUTO: 0.8 X10(3) UL (ref 1–4)
LYMPHOCYTES NFR BLD AUTO: 26 %
MCH RBC QN AUTO: 36.9 PG (ref 26–34)
MCHC RBC AUTO-ENTMCNC: 35.2 G/DL (ref 31–37)
MCV RBC AUTO: 104.8 FL
MONOCYTES # BLD AUTO: 0.47 X10(3) UL (ref 0.1–1)
MONOCYTES NFR BLD AUTO: 15.3 %
NEUTROPHILS # BLD AUTO: 1.75 X10 (3) UL (ref 1.5–7.7)
NEUTROPHILS # BLD AUTO: 1.75 X10(3) UL (ref 1.5–7.7)
NEUTROPHILS NFR BLD AUTO: 56.8 %
PLATELET # BLD AUTO: 100 10(3)UL (ref 150–450)
RBC # BLD AUTO: 3.52 X10(6)UL
WBC # BLD AUTO: 3.1 X10(3) UL (ref 4–11)

## 2021-08-16 NOTE — TELEPHONE ENCOUNTER
Patient has a ct scan scheduled for 8/19 and Dr Astrid Navarrete wanted to see him as soon as possible to discuss per patient, first available appointment is 8/26 at 1:30 pm, is this ok or should he been seen sooner, if yes please provide date and time

## 2021-08-17 ENCOUNTER — TELEPHONE (OUTPATIENT)
Dept: HEMATOLOGY/ONCOLOGY | Facility: HOSPITAL | Age: 46
End: 2021-08-17

## 2021-08-17 ENCOUNTER — OFFICE VISIT (OUTPATIENT)
Dept: GASTROENTEROLOGY | Facility: CLINIC | Age: 46
End: 2021-08-17
Payer: COMMERCIAL

## 2021-08-17 ENCOUNTER — TELEPHONE (OUTPATIENT)
Dept: GASTROENTEROLOGY | Facility: CLINIC | Age: 46
End: 2021-08-17

## 2021-08-17 VITALS
SYSTOLIC BLOOD PRESSURE: 130 MMHG | HEART RATE: 93 BPM | BODY MASS INDEX: 26.46 KG/M2 | DIASTOLIC BLOOD PRESSURE: 87 MMHG | WEIGHT: 195.38 LBS | HEIGHT: 72 IN

## 2021-08-17 DIAGNOSIS — K31.A0 INTESTINAL METAPLASIA OF GASTRIC MUCOSA: ICD-10-CM

## 2021-08-17 DIAGNOSIS — K74.00 FIBROSIS OF LIVER: Primary | ICD-10-CM

## 2021-08-17 DIAGNOSIS — R74.8 ELEVATED LIVER ENZYMES: ICD-10-CM

## 2021-08-17 PROCEDURE — 3075F SYST BP GE 130 - 139MM HG: CPT | Performed by: INTERNAL MEDICINE

## 2021-08-17 PROCEDURE — 3079F DIAST BP 80-89 MM HG: CPT | Performed by: INTERNAL MEDICINE

## 2021-08-17 PROCEDURE — 99214 OFFICE O/P EST MOD 30 MIN: CPT | Performed by: INTERNAL MEDICINE

## 2021-08-17 PROCEDURE — 3008F BODY MASS INDEX DOCD: CPT | Performed by: INTERNAL MEDICINE

## 2021-08-17 RX ORDER — METHOCARBAMOL 750 MG/1
750 TABLET, FILM COATED ORAL AS NEEDED
COMMUNITY
Start: 2021-02-04

## 2021-08-17 RX ORDER — SULFAMETHOXAZOLE/TRIMETHOPRIM 400MG-80MG
0.5 TABLET ORAL ONCE
Qty: 0.5 ML | Refills: 0
Start: 2021-08-17 | End: 2021-08-17

## 2021-08-17 RX ORDER — METOPROLOL SUCCINATE 25 MG/1
1 TABLET, EXTENDED RELEASE ORAL DAILY
COMMUNITY
Start: 2020-01-16

## 2021-08-17 NOTE — PATIENT INSTRUCTIONS
1.Schedule upper endoscopy (EGD) with Dr. Juan Diego Aguilar     2. You will also need to get tested for COVID within 72 hours prior to the procedure. You should be contacted regarding the instructions for this.     3. Complete hepatitis A and B vaccinations    Can

## 2021-08-17 NOTE — TELEPHONE ENCOUNTER
Gi staff:    Please schedule patient for vaccinations Hep B and A recombinant for tomorrow and then again at 6 months    Needs hep B in 1 month from initial hep B    Thanks    Ellyn Alvarez MD  [facility], [facility] - Gastroenterology  8/17/2021  12:16 PM

## 2021-08-17 NOTE — TELEPHONE ENCOUNTER
Patient calling to let Dr. Georgia Vega know he saw Dr Nacho De La Rosa today.   If he would like to review the notes

## 2021-08-17 NOTE — TELEPHONE ENCOUNTER
Patient is scheduled for first dose of hep A and hep B vaccine tomorrow. Per Ralf Valentin MA patient to be scheduled for follow-up doses after tomorrow's appointment.

## 2021-08-17 NOTE — PROGRESS NOTES
[facility] - Gastroenterology                                                                                                  Clinic Progress Note    Patient pr History:   Diagnosis Date   • Anxiety with depression    • Chronic pain syndrome    • DDD (degenerative disc disease), lumbar    • GERD (gastroesophageal reflux disease)    • Hypertension    • Steatohepatitis 2020      Past Surgical History:   Procedure La reviewed and were negative except as noted in the HPI    PHYSICAL EXAM:   Blood pressure 130/87, pulse 93, height 6' (1.829 m), weight 195 lb 6.4 oz (88.6 kg).     General:awake, cooperative, no acute distress  HEENT: EOMI, no scleral icterus, MMM; oral pha undergo cholecystectomy in December with liver biopsy at that time noted for hepatic parenchyma with steatohepatitis, necroinflammatory grade 2–3, stage III.       Discussed that there is suggestion of advanced fibrosis on liver biopsy in December and while

## 2021-08-18 ENCOUNTER — NURSE ONLY (OUTPATIENT)
Dept: GASTROENTEROLOGY | Facility: CLINIC | Age: 46
End: 2021-08-18
Payer: COMMERCIAL

## 2021-08-18 ENCOUNTER — TELEPHONE (OUTPATIENT)
Dept: GASTROENTEROLOGY | Facility: CLINIC | Age: 46
End: 2021-08-18

## 2021-08-18 ENCOUNTER — TELEPHONE (OUTPATIENT)
Dept: SURGERY | Facility: CLINIC | Age: 46
End: 2021-08-18

## 2021-08-18 DIAGNOSIS — K31.A0 INTESTINAL METAPLASIA OF GASTRIC MUCOSA: ICD-10-CM

## 2021-08-18 DIAGNOSIS — R74.8 ELEVATED LIVER ENZYMES: ICD-10-CM

## 2021-08-18 DIAGNOSIS — K74.00 FIBROSIS OF LIVER: Primary | ICD-10-CM

## 2021-08-18 DIAGNOSIS — Z23 NEED FOR PROPHYLACTIC VACCINATION AGAINST HEPATITIS A AND HEPATITIS B IN ADULT: ICD-10-CM

## 2021-08-18 DIAGNOSIS — K43.2 INCISIONAL HERNIA, WITHOUT OBSTRUCTION OR GANGRENE: Primary | ICD-10-CM

## 2021-08-18 PROCEDURE — 90471 IMMUNIZATION ADMIN: CPT | Performed by: INTERNAL MEDICINE

## 2021-08-18 PROCEDURE — 90636 HEP A/HEP B VACC ADULT IM: CPT | Performed by: INTERNAL MEDICINE

## 2021-08-18 NOTE — TELEPHONE ENCOUNTER
Dr. Reddy Wilson,    Per supervisor Sukhdeep Benton, correct orders pended for Hep A and Hep B vaccine. Please sign if appropriate, thank you.

## 2021-08-18 NOTE — PROGRESS NOTES
Patient signed consent form for vaccine administration, I showed patient the vaccine pre loaded syringe, to verify medication and  expiration date to be administered.      1st Twinrix vaccine administered @ 9:29 am on Left Deltoid  NDC # [id number]   Lot

## 2021-08-18 NOTE — TELEPHONE ENCOUNTER
Scheduled for:  ,Cheikh Bean [id number]   Provider Name:  Dr. Brandy Brewer  Date:  9/15/21  Location: [facility]   Sedation:  Mac   Time: 0800 Am (pt is aware that [facility] will call the day before to confirm arrival time)     Prep: Egd Prep instructions were given to pt in the [facility]

## 2021-08-18 NOTE — PROGRESS NOTES
Approved Medication Requests       Hepatitis A-Hep B Recomb Vac        720-20 ELU-MCG/ML Susp, 1 mL Intramuscular     720-20 ELU-MCG/ML Susp, 1 mL Intramuscular, 6 months from initial       720-20 ELU-MCG/ML Susp, 1 mL Intramuscular, At 1 month

## 2021-08-18 NOTE — TELEPHONE ENCOUNTER
Signed    Thanks    Jean Carlos Douglas MD  [facility], [facility] - Gastroenterology  8/18/2021  3:01 PM

## 2021-08-19 ENCOUNTER — HOSPITAL ENCOUNTER (OUTPATIENT)
Dept: CT IMAGING | Facility: HOSPITAL | Age: 46
Discharge: HOME | End: 2021-08-19
Attending: INTERNAL MEDICINE
Payer: COMMERCIAL

## 2021-08-19 DIAGNOSIS — D69.6 THROMBOCYTOPENIA (HCC): ICD-10-CM

## 2021-08-19 DIAGNOSIS — D53.9 MACROCYTIC ANEMIA: ICD-10-CM

## 2021-08-19 DIAGNOSIS — R59.0 RETROPERITONEAL LYMPHADENOPATHY: ICD-10-CM

## 2021-08-19 PROCEDURE — 74177 CT ABD & PELVIS W/CONTRAST: CPT | Performed by: INTERNAL MEDICINE

## 2021-08-20 ENCOUNTER — TELEPHONE (OUTPATIENT)
Dept: HEMATOLOGY/ONCOLOGY | Facility: HOSPITAL | Age: 46
End: 2021-08-20

## 2021-08-20 NOTE — TELEPHONE ENCOUNTER
Left message for the patient that I did see CT scan findings, advised him to discuss findings with hematologist and gastroenterologist for further guidance.

## 2021-08-20 NOTE — TELEPHONE ENCOUNTER
LMOVM stating LAD noted - 2 referenced increased by 0.5 cm in one dimension. Prior to this communication, follow-up appointment with Dr. Frantz Colon has been moved up to Monday, 8/23/21.   Explained that Dr. rFantz Colon will likely order additional (imaging) testing

## 2021-08-23 ENCOUNTER — LAB ENCOUNTER (OUTPATIENT)
Dept: LAB | Facility: HOSPITAL | Age: 46
End: 2021-08-23
Attending: INTERNAL MEDICINE
Payer: COMMERCIAL

## 2021-08-23 ENCOUNTER — APPOINTMENT (OUTPATIENT)
Dept: HEMATOLOGY/ONCOLOGY | Facility: HOSPITAL | Age: 46
End: 2021-08-23
Attending: INTERNAL MEDICINE
Payer: COMMERCIAL

## 2021-08-23 ENCOUNTER — TELEPHONE (OUTPATIENT)
Dept: HEMATOLOGY/ONCOLOGY | Facility: HOSPITAL | Age: 46
End: 2021-08-23

## 2021-08-23 VITALS
TEMPERATURE: 98 F | RESPIRATION RATE: 18 BRPM | BODY MASS INDEX: 27.13 KG/M2 | WEIGHT: 200.31 LBS | DIASTOLIC BLOOD PRESSURE: 92 MMHG | HEART RATE: 117 BPM | OXYGEN SATURATION: 100 % | HEIGHT: 72 IN | SYSTOLIC BLOOD PRESSURE: 149 MMHG

## 2021-08-23 DIAGNOSIS — R59.0 RETROPERITONEAL LYMPHADENOPATHY: ICD-10-CM

## 2021-08-23 DIAGNOSIS — D53.9 MACROCYTIC ANEMIA: ICD-10-CM

## 2021-08-23 DIAGNOSIS — R16.1 SPLENOMEGALY: ICD-10-CM

## 2021-08-23 DIAGNOSIS — D69.6 THROMBOCYTOPENIA (HCC): ICD-10-CM

## 2021-08-23 DIAGNOSIS — R59.0 RETROPERITONEAL LYMPHADENOPATHY: Primary | ICD-10-CM

## 2021-08-23 LAB
ALBUMIN SERPL-MCNC: 3.3 G/DL (ref 3.4–5)
ALBUMIN/GLOB SERPL: 0.7 {RATIO} (ref 1–2)
ALP LIVER SERPL-CCNC: 194 U/L
ALT SERPL-CCNC: 54 U/L
ANION GAP SERPL CALC-SCNC: 5 MMOL/L (ref 0–18)
AST SERPL-CCNC: 100 U/L (ref 15–37)
B2 MICROGLOB SERPL-MCNC: 0.23 MG/DL (ref 0.11–0.25)
BILIRUB SERPL-MCNC: 1 MG/DL (ref 0.1–2)
BUN BLD-MCNC: 4 MG/DL (ref 7–18)
BUN/CREAT SERPL: 5.5 (ref 10–20)
CALCIUM BLD-MCNC: 8.6 MG/DL (ref 8.5–10.1)
CHLORIDE SERPL-SCNC: 105 MMOL/L (ref 98–112)
CO2 SERPL-SCNC: 27 MMOL/L (ref 21–32)
CREAT BLD-MCNC: 0.73 MG/DL
GLOBULIN PLAS-MCNC: 5 G/DL (ref 2.8–4.4)
GLUCOSE BLD-MCNC: 100 MG/DL (ref 70–99)
LDH SERPL L TO P-CCNC: 228 U/L
M PROTEIN MFR SERPL ELPH: 8.3 G/DL (ref 6.4–8.2)
OSMOLALITY SERPL CALC.SUM OF ELEC: 281 MOSM/KG (ref 275–295)
PATIENT FASTING Y/N/NP: NO
POTASSIUM SERPL-SCNC: 3.6 MMOL/L (ref 3.5–5.1)
SODIUM SERPL-SCNC: 137 MMOL/L (ref 136–145)
URATE SERPL-MCNC: 7.6 MG/DL

## 2021-08-23 PROCEDURE — 82232 ASSAY OF BETA-2 PROTEIN: CPT

## 2021-08-23 PROCEDURE — 99215 OFFICE O/P EST HI 40 MIN: CPT | Performed by: INTERNAL MEDICINE

## 2021-08-23 PROCEDURE — 83615 LACTATE (LD) (LDH) ENZYME: CPT

## 2021-08-23 PROCEDURE — 36415 COLL VENOUS BLD VENIPUNCTURE: CPT

## 2021-08-23 PROCEDURE — 80053 COMPREHEN METABOLIC PANEL: CPT

## 2021-08-23 PROCEDURE — 84550 ASSAY OF BLOOD/URIC ACID: CPT

## 2021-08-23 NOTE — PROGRESS NOTES
Hematology Progress Note    Patient Name: Bria Owusu   YOB: 1975   Medical Record Number: [id number]   CSN: [id number]   Consulting Physician: Adore Webb MD  Referring Physician(s): Jose Miguel Hunter  Date of Visit: 8/23/2021     Chief Comp Anxiety with depression    • Chronic pain syndrome    • DDD (degenerative disc disease), lumbar    • GERD (gastroesophageal reflux disease)    • Hypertension    • Steatohepatitis 2020       Past Surgical History:  Past Surgical History:   Procedure Lateral some malaise  Eyes: Negative for visual disturbance, irritation and redness. Respiratory: Negative for cough, hemoptysis, chest pain, or dyspnea on exertion.   Gastrointestinal: Negative for dysphagia, odynophagia, reflux symptoms, nausea, vomiting, change 13.3 08/14/2021 10:41 AM    NEPRELIM 1.75 08/14/2021 10:41 AM    .0 (L) 08/14/2021 10:41 AM       Lab Results   Component Value Date/Time     (H) 08/23/2021 11:17 AM    BUN 4 (L) 08/23/2021 11:17 AM    CREATSERUM 0.73 08/23/2021 11:17 AM    G thrombocytopenia has been present for a few years but now recently lowered  --I reviewed with the patient and his wife his CT scan from October which shows that hepatomegaly at 22.2 cm and corresponding splenomegaly at 16.5 cm    --I informed the patient a planning to decrease alcohol intake and would like to follow-up with me in 4 weeks for repeat evaluation    --low clinical suspicion for him to have MDS based on his young age and no prior treatment with cytotoxic agents    --current medication list shows

## 2021-08-26 ENCOUNTER — APPOINTMENT (OUTPATIENT)
Dept: HEMATOLOGY/ONCOLOGY | Facility: HOSPITAL | Age: 46
End: 2021-08-26
Attending: INTERNAL MEDICINE
Payer: COMMERCIAL

## 2021-08-27 ENCOUNTER — HOSPITAL ENCOUNTER (OUTPATIENT)
Dept: NUCLEAR MEDICINE | Facility: HOSPITAL | Age: 46
Discharge: HOME | End: 2021-08-27
Attending: INTERNAL MEDICINE
Payer: COMMERCIAL

## 2021-08-27 ENCOUNTER — TELEPHONE (OUTPATIENT)
Dept: HEMATOLOGY/ONCOLOGY | Facility: HOSPITAL | Age: 46
End: 2021-08-27

## 2021-08-27 DIAGNOSIS — R16.1 SPLENOMEGALY: ICD-10-CM

## 2021-08-27 DIAGNOSIS — R59.0 RETROPERITONEAL LYMPHADENOPATHY: ICD-10-CM

## 2021-08-27 LAB — GLUCOSE BLDC GLUCOMTR-MCNC: 101 MG/DL (ref 70–99)

## 2021-08-27 PROCEDURE — 78815 PET IMAGE W/CT SKULL-THIGH: CPT | Performed by: INTERNAL MEDICINE

## 2021-08-27 PROCEDURE — 82962 GLUCOSE BLOOD TEST: CPT

## 2021-08-27 NOTE — TELEPHONE ENCOUNTER
Patient called and asked if Dr. Negrita Griffin if he could call patient witth the result of the PET Scan. Thank you.

## 2021-08-30 ENCOUNTER — OFFICE VISIT (OUTPATIENT)
Dept: HEMATOLOGY/ONCOLOGY | Facility: HOSPITAL | Age: 46
End: 2021-08-30
Attending: INTERNAL MEDICINE
Payer: COMMERCIAL

## 2021-08-30 VITALS
RESPIRATION RATE: 18 BRPM | HEIGHT: 72 IN | DIASTOLIC BLOOD PRESSURE: 89 MMHG | TEMPERATURE: 99 F | SYSTOLIC BLOOD PRESSURE: 139 MMHG | BODY MASS INDEX: 26.95 KG/M2 | WEIGHT: 199 LBS | HEART RATE: 83 BPM | OXYGEN SATURATION: 100 %

## 2021-08-30 DIAGNOSIS — R79.89 ABNORMAL LIVER FUNCTION TESTS: Primary | ICD-10-CM

## 2021-08-30 PROCEDURE — 99214 OFFICE O/P EST MOD 30 MIN: CPT | Performed by: INTERNAL MEDICINE

## 2021-08-30 NOTE — PROGRESS NOTES
Hematology Progress Note    Patient Name: Joanne Shearer   YOB: 1975   Medical Record Number: [id number]   CSN: [id number]   Consulting Physician: Mingo Sheriff MD  Referring Physician(s): Sumanth Piña  Date of Visit: 8/30/2021     Chief Comp some homogenous uptake in the bone marrow likely related to chronic anemia as patient has not had any prior chemotherapy or radiation for this to be myelodysplastic syndrome which is otherwise an infiltrative process in older persons in the 7th and 8th dec Medications:  methocarbamol 750 MG Oral Tab, Take 750 mg by mouth as needed. , Disp: , Rfl:   metoprolol succinate 25 MG Oral Tablet 24 Hr, Take 1 tablet by mouth daily. , Disp: , Rfl:   tadalafil 5 MG Oral Tab, Take 1 tablet (5 mg total) by mouth daily. Tri Boudraeux lymphadenopathy throughout in the cervical, supraclavicular, axillary, or inguinal regions. Chest: Clear to auscultation. No wheezes or rales. Heart: Regular rate and rhythm. S1S2 normal.  Abdomen: Soft, non tender with good bowel sounds.   No hepatosplen liver function tests  (primary encounter diagnosis)  Plan: FERRITIN    39year old M with PMH relevant for anxiety with depression, regular alcohol use as well as steatohepatitis presents for thrombocytopenia with splenomegaly    Plan:    1.) Thrombocytope and decreasing/cutting back on alcohol use can also improve alcohol related changes to liver    --I am checking a ferritin level for him as his serum iron and iron saturation are both increased.  Discussed possibility of hereditary hemachromatosis, so need present. His PET scan on 8/27/2021 shows a mildly hypermetabolic retromandibular lymph node reported as likely reactive lymph node.   Patient and wife mentioning his dental device in place to keep his teeth straight which is likely the cause of this finding

## 2021-08-31 ENCOUNTER — LAB ENCOUNTER (OUTPATIENT)
Dept: LAB | Facility: HOSPITAL | Age: 46
End: 2021-08-31
Attending: INTERNAL MEDICINE
Payer: COMMERCIAL

## 2021-08-31 DIAGNOSIS — R79.89 ABNORMAL LIVER FUNCTION TESTS: ICD-10-CM

## 2021-08-31 LAB — DEPRECATED HBV CORE AB SER IA-ACNC: 378 NG/ML

## 2021-08-31 PROCEDURE — 36415 COLL VENOUS BLD VENIPUNCTURE: CPT

## 2021-08-31 PROCEDURE — 82728 ASSAY OF FERRITIN: CPT

## 2021-09-10 ENCOUNTER — APPOINTMENT (OUTPATIENT)
Dept: HEMATOLOGY/ONCOLOGY | Facility: HOSPITAL | Age: 46
End: 2021-09-10
Attending: INTERNAL MEDICINE
Payer: COMMERCIAL

## 2021-09-12 ENCOUNTER — LAB REQUISITION (OUTPATIENT)
Dept: SURGERY | Age: 46
End: 2021-09-12
Payer: COMMERCIAL

## 2021-09-12 DIAGNOSIS — Z01.818 PREOP EXAMINATION: ICD-10-CM

## 2021-09-14 LAB — SARS-COV-2 RNA RESP QL NAA+PROBE: NOT DETECTED

## 2021-09-15 ENCOUNTER — LAB REQUISITION (OUTPATIENT)
Dept: SURGERY | Age: 46
End: 2021-09-15
Payer: COMMERCIAL

## 2021-09-15 ENCOUNTER — SURGERY CENTER DOCUMENTATION (OUTPATIENT)
Dept: SURGERY | Age: 46
End: 2021-09-15

## 2021-09-15 DIAGNOSIS — K31.A0 INTESTINAL METAPLASIA OF GASTRIC MUCOSA: ICD-10-CM

## 2021-09-15 DIAGNOSIS — R74.8 SERUM AMYLASE ELEVATED: ICD-10-CM

## 2021-09-15 DIAGNOSIS — K74.00 HEPATIC FIBROSIS: ICD-10-CM

## 2021-09-15 DIAGNOSIS — K31.89: ICD-10-CM

## 2021-09-15 PROCEDURE — 43239 EGD BIOPSY SINGLE/MULTIPLE: CPT | Performed by: INTERNAL MEDICINE

## 2021-09-15 PROCEDURE — 88305 TISSUE EXAM BY PATHOLOGIST: CPT | Performed by: INTERNAL MEDICINE

## 2021-09-15 PROCEDURE — 88312 SPECIAL STAINS GROUP 1: CPT | Performed by: INTERNAL MEDICINE

## 2021-09-15 NOTE — PROCEDURES
Esophagogastroduodenoscopy (EGD) Report    Sathish Sepulveda     1975 Age 55year old   PCP Leti Galindo MD Endoscopist Shaan Collado MD     Date of procedure: 09/15/21    Procedure: EGD w/ biopsy     Pre-operative diagnosis: gastric intestinal m gastric mucosa was inspected carefully under high definition white light after clearance of bubbles. There was moderate non-specific gastropathy throughout. Multiple cold forceps biopsies were obtained in standard mapping fashion/protocol.  The gastric muco

## 2021-09-16 ENCOUNTER — TELEPHONE (OUTPATIENT)
Dept: SURGERY | Facility: CLINIC | Age: 46
End: 2021-09-16

## 2021-09-16 DIAGNOSIS — R79.89 ABNORMAL LIVER FUNCTION TEST: ICD-10-CM

## 2021-09-16 DIAGNOSIS — K43.2 INCISIONAL HERNIA, WITHOUT OBSTRUCTION OR GANGRENE: Primary | ICD-10-CM

## 2021-09-17 ENCOUNTER — NURSE ONLY (OUTPATIENT)
Dept: GASTROENTEROLOGY | Facility: CLINIC | Age: 46
End: 2021-09-17
Payer: COMMERCIAL

## 2021-09-17 PROCEDURE — 90636 HEP A/HEP B VACC ADULT IM: CPT | Performed by: INTERNAL MEDICINE

## 2021-09-17 PROCEDURE — 90471 IMMUNIZATION ADMIN: CPT | Performed by: INTERNAL MEDICINE

## 2021-09-17 RX ORDER — HEPATITIS A AND HEPATITIS B (RECOMBINANT) VACCINE 720; 20 [IU]/ML; UG/ML
1 INJECTION, SUSPENSION INTRAMUSCULAR ONCE
Qty: 1 EACH | Refills: 0
Start: 2021-09-17 | End: 2021-09-17

## 2021-09-20 NOTE — TELEPHONE ENCOUNTER
Per pt calling to see if he can reschedule his surgery for a sooner date. Per pt asking to scheduled for asap.  Per pt this is his 4th request. Please advise

## 2021-09-30 ENCOUNTER — TELEPHONE (OUTPATIENT)
Dept: SURGERY | Facility: CLINIC | Age: 46
End: 2021-09-30

## 2021-09-30 NOTE — PROGRESS NOTES
[facility]  [address] [facility], [address]  [address]  [phone] ([phone] Father         72   • Lipids Father 72        Hyperlipidemia   • DVT/VTE Maternal Grandfather         maybe immobilization    • Bleeding Disorders Neg    • Cancer Neg       Social History    Tobacco Use      Smoking status: Never Smoker      Smokeless toba [address], [place], [place],  [facility] (office)  [phone] (fax)  [phone] (mobile)  [email or web address]

## 2021-10-02 ENCOUNTER — LAB ENCOUNTER (OUTPATIENT)
Dept: LAB | Age: 46
End: 2021-10-02
Attending: SURGERY
Payer: COMMERCIAL

## 2021-10-02 DIAGNOSIS — Z01.818 PREOP TESTING: ICD-10-CM

## 2021-10-05 ENCOUNTER — ANESTHESIA EVENT (OUTPATIENT)
Dept: SURGERY | Facility: HOSPITAL | Age: 46
End: 2021-10-05
Payer: COMMERCIAL

## 2021-10-05 ENCOUNTER — HOSPITAL ENCOUNTER (OUTPATIENT)
Facility: HOSPITAL | Age: 46
Setting detail: HOSPITAL OUTPATIENT SURGERY
Discharge: HOME | End: 2021-10-05
Attending: SURGERY | Admitting: SURGERY
Payer: COMMERCIAL

## 2021-10-05 ENCOUNTER — ANESTHESIA (OUTPATIENT)
Dept: SURGERY | Facility: HOSPITAL | Age: 46
End: 2021-10-05
Payer: COMMERCIAL

## 2021-10-05 VITALS
DIASTOLIC BLOOD PRESSURE: 54 MMHG | TEMPERATURE: 98 F | HEART RATE: 63 BPM | OXYGEN SATURATION: 95 % | RESPIRATION RATE: 14 BRPM | HEIGHT: 72 IN | BODY MASS INDEX: 26.28 KG/M2 | SYSTOLIC BLOOD PRESSURE: 90 MMHG | WEIGHT: 194 LBS

## 2021-10-05 DIAGNOSIS — K43.2 INCISIONAL HERNIA WITHOUT OBSTRUCTION OR GANGRENE: ICD-10-CM

## 2021-10-05 DIAGNOSIS — R79.89 ABNORMAL LFTS: ICD-10-CM

## 2021-10-05 DIAGNOSIS — Z01.818 PREOP TESTING: Primary | ICD-10-CM

## 2021-10-05 PROCEDURE — 49654 LAP INC HERNIA REPAIR: CPT | Performed by: SURGERY

## 2021-10-05 PROCEDURE — 0WUF4JZ SUPPLEMENT ABDOMINAL WALL WITH SYNTHETIC SUBSTITUTE, PERCUTANEOUS ENDOSCOPIC APPROACH: ICD-10-PCS | Performed by: SURGERY

## 2021-10-05 DEVICE — VENTRALEX ST HERNIA PATCH
Type: IMPLANTABLE DEVICE | Site: ABDOMEN | Status: FUNCTIONAL
Brand: VENTRALEX ST HERNIA PATCH

## 2021-10-05 RX ORDER — HYDROMORPHONE HYDROCHLORIDE 1 MG/ML
0.4 INJECTION, SOLUTION INTRAMUSCULAR; INTRAVENOUS; SUBCUTANEOUS EVERY 5 MIN PRN
Status: DISCONTINUED | OUTPATIENT
Start: 2021-10-05 | End: 2021-10-05

## 2021-10-05 RX ORDER — NALOXONE HYDROCHLORIDE 0.4 MG/ML
80 INJECTION, SOLUTION INTRAMUSCULAR; INTRAVENOUS; SUBCUTANEOUS AS NEEDED
Status: DISCONTINUED | OUTPATIENT
Start: 2021-10-05 | End: 2021-10-05

## 2021-10-05 RX ORDER — MORPHINE SULFATE 10 MG/ML
6 INJECTION, SOLUTION INTRAMUSCULAR; INTRAVENOUS EVERY 10 MIN PRN
Status: DISCONTINUED | OUTPATIENT
Start: 2021-10-05 | End: 2021-10-05

## 2021-10-05 RX ORDER — ACETAMINOPHEN 500 MG
1000 TABLET ORAL ONCE
Status: COMPLETED | OUTPATIENT
Start: 2021-10-05 | End: 2021-10-05

## 2021-10-05 RX ORDER — HYDROMORPHONE HYDROCHLORIDE 1 MG/ML
0.6 INJECTION, SOLUTION INTRAMUSCULAR; INTRAVENOUS; SUBCUTANEOUS EVERY 5 MIN PRN
Status: DISCONTINUED | OUTPATIENT
Start: 2021-10-05 | End: 2021-10-05

## 2021-10-05 RX ORDER — TRAMADOL HYDROCHLORIDE 50 MG/1
50 TABLET ORAL EVERY 6 HOURS PRN
Qty: 16 TABLET | Refills: 0 | Status: SHIPPED | OUTPATIENT
Start: 2021-10-05 | End: 2021-10-20 | Stop reason: ALTCHOICE

## 2021-10-05 RX ORDER — OXYCODONE HYDROCHLORIDE 5 MG/1
5 TABLET ORAL EVERY 6 HOURS PRN
Qty: 12 TABLET | Refills: 0 | Status: SHIPPED | OUTPATIENT
Start: 2021-10-05 | End: 2021-10-20 | Stop reason: ALTCHOICE

## 2021-10-05 RX ORDER — ONDANSETRON 2 MG/ML
INJECTION INTRAMUSCULAR; INTRAVENOUS AS NEEDED
Status: DISCONTINUED | OUTPATIENT
Start: 2021-10-05 | End: 2021-10-05 | Stop reason: SURG

## 2021-10-05 RX ORDER — PROCHLORPERAZINE EDISYLATE 5 MG/ML
5 INJECTION INTRAMUSCULAR; INTRAVENOUS ONCE AS NEEDED
Status: DISCONTINUED | OUTPATIENT
Start: 2021-10-05 | End: 2021-10-05

## 2021-10-05 RX ORDER — BUPIVACAINE HYDROCHLORIDE AND EPINEPHRINE 5; 5 MG/ML; UG/ML
INJECTION, SOLUTION PERINEURAL AS NEEDED
Status: DISCONTINUED | OUTPATIENT
Start: 2021-10-05 | End: 2021-10-05 | Stop reason: HOSPADM

## 2021-10-05 RX ORDER — SODIUM CHLORIDE, SODIUM LACTATE, POTASSIUM CHLORIDE, CALCIUM CHLORIDE 600; 310; 30; 20 MG/100ML; MG/100ML; MG/100ML; MG/100ML
INJECTION, SOLUTION INTRAVENOUS CONTINUOUS
Status: DISCONTINUED | OUTPATIENT
Start: 2021-10-05 | End: 2021-10-05

## 2021-10-05 RX ORDER — HYDROCODONE BITARTRATE AND ACETAMINOPHEN 5; 325 MG/1; MG/1
1 TABLET ORAL AS NEEDED
Status: COMPLETED | OUTPATIENT
Start: 2021-10-05 | End: 2021-10-05

## 2021-10-05 RX ORDER — DEXAMETHASONE SODIUM PHOSPHATE 4 MG/ML
VIAL (ML) INJECTION AS NEEDED
Status: DISCONTINUED | OUTPATIENT
Start: 2021-10-05 | End: 2021-10-05 | Stop reason: SURG

## 2021-10-05 RX ORDER — ROCURONIUM BROMIDE 10 MG/ML
INJECTION, SOLUTION INTRAVENOUS AS NEEDED
Status: DISCONTINUED | OUTPATIENT
Start: 2021-10-05 | End: 2021-10-05 | Stop reason: SURG

## 2021-10-05 RX ORDER — ONDANSETRON 2 MG/ML
4 INJECTION INTRAMUSCULAR; INTRAVENOUS ONCE AS NEEDED
Status: DISCONTINUED | OUTPATIENT
Start: 2021-10-05 | End: 2021-10-05

## 2021-10-05 RX ORDER — METOPROLOL TARTRATE 5 MG/5ML
2.5 INJECTION INTRAVENOUS ONCE
Status: DISCONTINUED | OUTPATIENT
Start: 2021-10-05 | End: 2021-10-05

## 2021-10-05 RX ORDER — HYDROCODONE BITARTRATE AND ACETAMINOPHEN 5; 325 MG/1; MG/1
2 TABLET ORAL AS NEEDED
Status: COMPLETED | OUTPATIENT
Start: 2021-10-05 | End: 2021-10-05

## 2021-10-05 RX ORDER — MORPHINE SULFATE 4 MG/ML
2 INJECTION, SOLUTION INTRAMUSCULAR; INTRAVENOUS EVERY 10 MIN PRN
Status: DISCONTINUED | OUTPATIENT
Start: 2021-10-05 | End: 2021-10-05

## 2021-10-05 RX ORDER — HYDROMORPHONE HYDROCHLORIDE 1 MG/ML
0.2 INJECTION, SOLUTION INTRAMUSCULAR; INTRAVENOUS; SUBCUTANEOUS EVERY 5 MIN PRN
Status: DISCONTINUED | OUTPATIENT
Start: 2021-10-05 | End: 2021-10-05

## 2021-10-05 RX ORDER — CEFAZOLIN SODIUM/WATER 2 G/20 ML
2 SYRINGE (ML) INTRAVENOUS ONCE
Status: COMPLETED | OUTPATIENT
Start: 2021-10-05 | End: 2021-10-05

## 2021-10-05 RX ORDER — HALOPERIDOL 5 MG/ML
0.25 INJECTION INTRAMUSCULAR ONCE AS NEEDED
Status: DISCONTINUED | OUTPATIENT
Start: 2021-10-05 | End: 2021-10-05

## 2021-10-05 RX ORDER — LIDOCAINE HYDROCHLORIDE 10 MG/ML
INJECTION, SOLUTION EPIDURAL; INFILTRATION; INTRACAUDAL; PERINEURAL AS NEEDED
Status: DISCONTINUED | OUTPATIENT
Start: 2021-10-05 | End: 2021-10-05 | Stop reason: SURG

## 2021-10-05 RX ORDER — MIDAZOLAM HYDROCHLORIDE 1 MG/ML
INJECTION INTRAMUSCULAR; INTRAVENOUS AS NEEDED
Status: DISCONTINUED | OUTPATIENT
Start: 2021-10-05 | End: 2021-10-05 | Stop reason: SURG

## 2021-10-05 RX ORDER — MORPHINE SULFATE 4 MG/ML
4 INJECTION, SOLUTION INTRAMUSCULAR; INTRAVENOUS EVERY 10 MIN PRN
Status: DISCONTINUED | OUTPATIENT
Start: 2021-10-05 | End: 2021-10-05

## 2021-10-05 RX ADMIN — SODIUM CHLORIDE, SODIUM LACTATE, POTASSIUM CHLORIDE, CALCIUM CHLORIDE: 600; 310; 30; 20 INJECTION, SOLUTION INTRAVENOUS at 13:08:00

## 2021-10-05 RX ADMIN — ROCURONIUM BROMIDE 50 MG: 10 INJECTION, SOLUTION INTRAVENOUS at 13:13:00

## 2021-10-05 RX ADMIN — LIDOCAINE HYDROCHLORIDE 50 MG: 10 INJECTION, SOLUTION EPIDURAL; INFILTRATION; INTRACAUDAL; PERINEURAL at 13:13:00

## 2021-10-05 RX ADMIN — SODIUM CHLORIDE, SODIUM LACTATE, POTASSIUM CHLORIDE, CALCIUM CHLORIDE: 600; 310; 30; 20 INJECTION, SOLUTION INTRAVENOUS at 14:30:00

## 2021-10-05 RX ADMIN — MIDAZOLAM HYDROCHLORIDE 2 MG: 1 INJECTION INTRAMUSCULAR; INTRAVENOUS at 13:09:00

## 2021-10-05 RX ADMIN — CEFAZOLIN SODIUM/WATER 2 G: 2 G/20 ML SYRINGE (ML) INTRAVENOUS at 13:20:00

## 2021-10-05 RX ADMIN — DEXAMETHASONE SODIUM PHOSPHATE 4 MG: 4 MG/ML VIAL (ML) INJECTION at 13:30:00

## 2021-10-05 RX ADMIN — ONDANSETRON 4 MG: 2 INJECTION INTRAMUSCULAR; INTRAVENOUS at 14:09:00

## 2021-10-05 NOTE — ANESTHESIA PREPROCEDURE EVALUATION
Anesthesia PreOp Note    HPI:     Eboni Tobias is a 55year old male who presents for preoperative consultation requested by: Michael Hannah MD    Date of Surgery: 10/5/2021    Procedure(s):  LAPAROSCOPIC ASSISTED INCISIONAL HERNIA REPAIR WITH MESH  Mildred Tab, Take 750 mg by mouth as needed. , Disp: , Rfl: , More than a month at Unknown time  tadalafil 5 MG Oral Tab, Take 1 tablet (5 mg total) by mouth daily. , Disp: 90 tablet, Rfl: 0, Unknown at Unknown time  clonazePAM 0.5 MG Oral Tab, Take  1 tab po every Concern: Not Asked        Weight Concern: Not Asked        Special Diet: Not Asked        Back Care: Not Asked        Exercise: Not Asked        Bike Helmet: Not Asked        Seat Belt: Not Asked        Self-Exams: Not Asked    Social History Narrative 100.0 (L) 08/14/2021     Lab Results   Component Value Date     08/23/2021    K 3.6 08/23/2021     08/23/2021    CO2 27.0 08/23/2021    BUN 4 (L) 08/23/2021    CREATSERUM 0.73 08/23/2021     (H) 08/23/2021    PGLU 101 (H) 08/27/2021    C

## 2021-10-05 NOTE — ANESTHESIA POSTPROCEDURE EVALUATION
Patient: Yudelka Quiroga    Procedure Summary     Date: 10/05/21 Room / Location: [address] [place] [address] MAIN OR 03 / [address] [facility] MAIN OR    Anesthesia Start: 6402 Anesthesia Stop: 1430    Procedure: LAPAROSCOPIC ASSISTED INCISIONAL HERNIA REPAIR WITH MESH (N/A Abdomen) Diagnosis:

## 2021-10-05 NOTE — OPERATIVE REPORT
[facility]    PATIENT'S NAME: Christine Yasir   ATTENDING PHYSICIAN: Annette Villasenor MD   OPERATING PHYSICIAN: Annette Villasenor MD   PATIENT ACCOUNT#:   [id number]    LOCATION:  [facility] [address] PACU [address] [place] [address]  MEDICAL RECORD #:   [id number]       DATE Carlota Gaming hernia defect using a previous scar. These were placed under direct laparoscopic vision and all sites infiltrated with Marcaine. Brief examination of the lower abdomen was unremarkable.   He had some mildly dilated loops of small bowel in the right lowe

## 2021-10-05 NOTE — BRIEF OP NOTE
Pre-Operative Diagnosis: Incisional hernia without obstruction or gangrene [K43.2]  Abnormal LFTs [R94.5]     Post-Operative Diagnosis: Incisional hernia without obstruction or gangrene [I71. 2]Abnormal LFTs [R94.5]      Procedure Performed:   LAPAROSCOPIC

## 2021-10-05 NOTE — ANESTHESIA PROCEDURE NOTES
Airway  Date/Time: 10/5/2021 1:16 PM  Urgency: Elective    Airway not difficult    General Information and Staff    Patient location during procedure: OR  Anesthesiologist: Lobo Ricci DO  Resident/CRNA: Natalie Lawton CRNA  Performed: ANDREEA     Ind

## 2021-10-05 NOTE — H&P
History and Physical      Jessica Horn is a 55year old male. HPI   No chief complaint on file. HPI  Old H and P and OP notes reviewed, no fam hx hernia, sees Dr. Vini Gallegos for spinal cord stimulator/pain issues.   Epigastric bulge slowly enlarging of Onset   • Hypertension Father         72   • Lipids Father 72        Hyperlipidemia   • DVT/VTE Maternal Grandfather         maybe immobilization    • Bleeding Disorders Neg    • Cancer Neg        Review of Systems   A comprehensive 10 point review of s appendectomy.  Scattered stool in the colon.  No obstruction, bowel wall thickening, or mesenteric mass.    ABDOMINAL WALL: Findings suggesting umbilical herniorrhaphy with a 1 x 1.1 cm fat containing lesion in the greater omentum adjacent to the herniorrha Total   8.5 - 10.1 mg/dL 8.9    Calculated Osmolality   275 - 295 mOsm/kg 273Low     GFR, Non-   >=60 107    GFR, -American   >=60 123    ALT   16 - 61 U/L 108High     AST   15 - 37 U/L 239High     Alkaline Phosphatase   45 - 117 U/L report from [facility]. Eduin Rios MD    Addendum: [facility] OP note reviewed. 9/29/21 Addendum:  GI and Hematology notes reviewed, CT and PET scan and lab results reviewed. Plan lap hernia repair with mesh. Assess liver and lymph nodes intraoperatively a

## 2021-10-05 NOTE — INTERVAL H&P NOTE
Pre-op Diagnosis: Incisional hernia without obstruction or gangrene [K43.2]  Abnormal LFTs [R94.5]    The above referenced H&P was reviewed by Estrellita Calderon MD on 10/5/2021, the patient was examined and no significant changes have occurred in the patient's

## 2021-10-08 ENCOUNTER — TELEPHONE (OUTPATIENT)
Dept: SURGERY | Facility: CLINIC | Age: 46
End: 2021-10-08

## 2021-10-08 NOTE — TELEPHONE ENCOUNTER
Spoke with patient who has complaints of a very swollen left elbow. States the left arm was not where the IV was, nor does he remember bumping the elbow on anything. Was questioning if his arm was injured somehow when being moved after surgery.   Advised

## 2021-10-12 ENCOUNTER — TELEPHONE (OUTPATIENT)
Dept: SURGERY | Facility: CLINIC | Age: 46
End: 2021-10-12

## 2021-10-12 NOTE — TELEPHONE ENCOUNTER
Spoke with patient giving him appointment for 10/13, he states his incision is oozing pus and blood, with some redness around the incision. Also, his elbow is better, the swelling went down somewhat and the soreness is going away.

## 2021-10-13 ENCOUNTER — OFFICE VISIT (OUTPATIENT)
Dept: SURGERY | Facility: CLINIC | Age: 46
End: 2021-10-13
Payer: COMMERCIAL

## 2021-10-13 DIAGNOSIS — K43.9 VENTRAL HERNIA WITHOUT OBSTRUCTION OR GANGRENE: Primary | ICD-10-CM

## 2021-10-13 PROCEDURE — 99024 POSTOP FOLLOW-UP VISIT: CPT | Performed by: SURGERY

## 2021-10-13 RX ORDER — CEFADROXIL 500 MG/1
500 CAPSULE ORAL 2 TIMES DAILY
Qty: 20 CAPSULE | Refills: 0 | Status: SHIPPED | OUTPATIENT
Start: 2021-10-13 | End: 2021-10-23

## 2021-10-14 RX ORDER — TADALAFIL 5 MG/1
TABLET ORAL
Qty: 90 TABLET | Refills: 0 | OUTPATIENT
Start: 2021-10-14

## 2021-10-14 NOTE — PROGRESS NOTES
Postoperative Patient Follow-up      10/13/2021    Moses Oppenheim 55year old      HPI  Patient presents with:  Post-Op: Pt here for 1st post op s/p Lap. ventral hernia repair on 10/5/2021.   Pt c/o oozing and redness to mid- abdominal  incision the next da

## 2021-10-15 NOTE — TELEPHONE ENCOUNTER
Please advise patient I denied refill on Cialis, I would like to see him for follow-up visit first before we will continue medication.

## 2021-10-20 ENCOUNTER — OFFICE VISIT (OUTPATIENT)
Dept: SURGERY | Facility: CLINIC | Age: 46
End: 2021-10-20
Payer: COMMERCIAL

## 2021-10-20 VITALS — BODY MASS INDEX: 26 KG/M2 | WEIGHT: 194 LBS

## 2021-10-20 DIAGNOSIS — K43.9 VENTRAL HERNIA WITHOUT OBSTRUCTION OR GANGRENE: Primary | ICD-10-CM

## 2021-10-20 PROCEDURE — 99024 POSTOP FOLLOW-UP VISIT: CPT | Performed by: SURGERY

## 2021-10-20 NOTE — PROGRESS NOTES
Postoperative Patient Follow-up      10/20/2021    Aniket Benavides 55year old      HPI  Patient presents with: Follow - Up: Patient is here for followup after complaints of oozing and redness to the incision.   Incision is clean and appears to be healing w

## 2021-11-01 RX ORDER — TADALAFIL 5 MG/1
5 TABLET ORAL DAILY
Qty: 90 TABLET | Refills: 0 | Status: SHIPPED | OUTPATIENT
Start: 2021-11-01 | End: 2022-01-28

## 2021-11-01 NOTE — TELEPHONE ENCOUNTER
Protocol failed or has No Protocol, please review  90 day refill given on 11/01/21, appointment needed for further refills. Requested Prescriptions   Pending Prescriptions Disp Refills    TADALAFIL 5 MG Oral Tab [Pharmacy Med Name: TADALAFIL 5 MG TABLET] 90 tablet 0     Sig: TAKE ONE TABLET BY MOUTH ONCE DAILY        Genitourinary Medications Failed - 11/1/2021  4:01 PM        Failed - Appointment in the past 12 or next 3 months        Passed - Patient does not have pulmonary hypertension on problem list            Future Appointments         Provider Department Appt Notes    In 4 weeks Sangeeta Cox MD [facility] Hematology Oncology FOLLOW UP VISIT. CL  3m    In 3 months Quentin Ortega MD [facility] follow up          Recent Outpatient Visits              1 week ago Ventral hernia without obstruction or gangrene    [facility] Elly Alvarado MD    Office Visit    2 weeks ago Ventral hernia without obstruction or gangrene    [facility] Elly Alvarado MD    Office Visit    1 month ago Alcoholic cirrhosis of liver without ascites [facility])    [facility] Jamison Ruiz MD    Office Visit    1 month ago     [place], [facility], [address] [place] [address], [facility] Only    2 months ago Abnormal liver function tests    [facility] Hematology Oncology Sangeeta Cox MD    Office Visit

## 2021-11-01 NOTE — TELEPHONE ENCOUNTER
Protocol failed or has No Protocol, please review  90 day refill given on 11/01/21, appointment needed for further refills. Requested Prescriptions   Pending Prescriptions Disp Refills    TADALAFIL 5 MG Oral Tab [Pharmacy Med Name: TADALAFIL 5 MG TABLET] 90 tablet 0     Sig: TAKE ONE TABLET BY MOUTH ONCE DAILY        Genitourinary Medications Failed - 11/1/2021  4:01 PM        Failed - Appointment in the past 12 or next 3 months        Passed - Patient does not have pulmonary hypertension on problem list            Future Appointments         Provider Department Appt Notes    In 4 weeks Dm Bernardo MD [facility] Hematology Oncology FOLLOW UP VISIT. CL  3m    In 3 months Pérez Ortega MD [facility] follow up          Recent Outpatient Visits              1 week ago Ventral hernia without obstruction or gangrene    [facility] Laura Stanley MD    Office Visit    2 weeks ago Ventral hernia without obstruction or gangrene    [facility] Laura Stanley MD    Office Visit    1 month ago Alcoholic cirrhosis of liver without ascites [facility])    THE [facility] Vinicio Muller MD    Office Visit    1 month ago     [facility], [facility], [address] [place] [address], [place] [address] Only    2 months ago Abnormal liver function tests    [facility] Hematology Oncology Dm Bernardo MD    Office Visit

## 2021-11-09 ENCOUNTER — TELEPHONE (OUTPATIENT)
Dept: SURGERY | Facility: CLINIC | Age: 46
End: 2021-11-09

## 2021-11-09 NOTE — TELEPHONE ENCOUNTER
Advised patient to wear his binder at work for the next few days, then notify the office if he is still having problems with the swelling.

## 2021-11-09 NOTE — TELEPHONE ENCOUNTER
Patient indicates he has some swelling above belly button. Patient unsure if he is healing correctly. Please call at work phone [phone], only before 2pm, thanks.

## 2021-11-29 ENCOUNTER — TELEPHONE (OUTPATIENT)
Dept: HEMATOLOGY/ONCOLOGY | Facility: HOSPITAL | Age: 46
End: 2021-11-29

## 2021-11-29 DIAGNOSIS — D69.6 THROMBOCYTOPENIA (HCC): Primary | ICD-10-CM

## 2021-11-29 DIAGNOSIS — R16.1 SPLENOMEGALY: ICD-10-CM

## 2021-11-29 NOTE — TELEPHONE ENCOUNTER
Called patient to inform him about upcoming appointment tomorrow with Dr. Frantz Colon. Patient stated that he wanted to reschedule for next week so that he had more time to get his labs.  Patient was rescheduled for December 8th at 3:00pm. Patient read back and

## 2021-11-30 ENCOUNTER — APPOINTMENT (OUTPATIENT)
Dept: HEMATOLOGY/ONCOLOGY | Facility: HOSPITAL | Age: 46
End: 2021-11-30
Attending: INTERNAL MEDICINE
Payer: COMMERCIAL

## 2021-12-08 ENCOUNTER — APPOINTMENT (OUTPATIENT)
Dept: HEMATOLOGY/ONCOLOGY | Facility: HOSPITAL | Age: 46
End: 2021-12-08
Attending: INTERNAL MEDICINE
Payer: COMMERCIAL

## 2021-12-14 ENCOUNTER — APPOINTMENT (OUTPATIENT)
Dept: HEMATOLOGY/ONCOLOGY | Facility: HOSPITAL | Age: 46
End: 2021-12-14
Attending: INTERNAL MEDICINE
Payer: COMMERCIAL

## 2021-12-28 ENCOUNTER — TELEPHONE (OUTPATIENT)
Dept: HEMATOLOGY/ONCOLOGY | Facility: HOSPITAL | Age: 46
End: 2021-12-28

## 2021-12-28 NOTE — TELEPHONE ENCOUNTER
----- Message from Rhonda Cruz RN sent at 12/27/2021  4:44 PM CST -----  Regarding: Needs Follow Up scheduled  Hi, can you please reach out to patient to schedule a follow up appointment with Dr. Dontae Robles.  He was due to be seen in November, but has since

## 2022-01-17 ENCOUNTER — HOSPITAL ENCOUNTER (OUTPATIENT)
Age: 47
Discharge: HOME | End: 2022-01-17
Payer: COMMERCIAL

## 2022-01-17 ENCOUNTER — NURSE TRIAGE (OUTPATIENT)
Dept: INTERNAL MEDICINE CLINIC | Facility: CLINIC | Age: 47
End: 2022-01-17

## 2022-01-17 ENCOUNTER — APPOINTMENT (OUTPATIENT)
Dept: GENERAL RADIOLOGY | Age: 47
End: 2022-01-17
Attending: PHYSICIAN ASSISTANT
Payer: COMMERCIAL

## 2022-01-17 ENCOUNTER — TELEPHONE (OUTPATIENT)
Dept: INTERNAL MEDICINE CLINIC | Facility: CLINIC | Age: 47
End: 2022-01-17

## 2022-01-17 VITALS
DIASTOLIC BLOOD PRESSURE: 95 MMHG | HEIGHT: 72 IN | RESPIRATION RATE: 16 BRPM | HEART RATE: 115 BPM | TEMPERATURE: 99 F | SYSTOLIC BLOOD PRESSURE: 138 MMHG | WEIGHT: 200 LBS | OXYGEN SATURATION: 97 % | BODY MASS INDEX: 27.09 KG/M2

## 2022-01-17 DIAGNOSIS — S99.911A INJURY OF RIGHT ANKLE, INITIAL ENCOUNTER: Primary | ICD-10-CM

## 2022-01-17 PROCEDURE — 73610 X-RAY EXAM OF ANKLE: CPT | Performed by: PHYSICIAN ASSISTANT

## 2022-01-17 PROCEDURE — 99213 OFFICE O/P EST LOW 20 MIN: CPT | Performed by: PHYSICIAN ASSISTANT

## 2022-01-17 PROCEDURE — L4350 ANKLE CONTROL ORTHO PRE OTS: HCPCS | Performed by: PHYSICIAN ASSISTANT

## 2022-01-17 NOTE — ED PROVIDER NOTES
Patient Seen in: [facility]      History   Patient presents with:  Leg or Foot Injury    Stated Complaint: Possible foot injury    Subjective:   HPI    80-year-old male presents for evaluation of right ankle pain, swelling.   Patient endorses Temp src Temporal   SpO2 97 %   O2 Device None (Room air)       Current:BP (!) 138/95   Pulse 115   Temp 98.6 °F (37 °C) (Temporal)   Resp 16   Ht 182.9 cm (6')   Wt 90.7 kg   SpO2 97%   BMI 27.12 kg/m²         Physical Exam  Vitals and nursing note revi 1/17/2022  2:28 PM

## 2022-01-17 NOTE — ED INITIAL ASSESSMENT (HPI)
Pt presents with right ankle pain, pt reports injury to ankle at 3a Sunday 1/16/22     Motrin PO taken at [facility] today

## 2022-01-17 NOTE — TELEPHONE ENCOUNTER
I can see patient tomorrow only at 11;40AM no other availability, he can see any available provider tomorrow if this is only day he can be seen

## 2022-01-17 NOTE — TELEPHONE ENCOUNTER
Dr. Daya Alas - [facility] advised. Right ankle Injury. See Also 1/17 TE, appt tomorrow. Patient calling office, transferred to triage from Call Center. RN spoke with patient. Patient's date of birth and full name both confirmed.      On Saturday, he

## 2022-01-17 NOTE — TELEPHONE ENCOUNTER
Patient is calling to request an appointment  For 1/18/22 anytime to follow up on a lump on the left side of the head; notes it's been there a couple of months. Patient notes sinus issues for 1 month now and thinks it might be related. There are not open appt on 1/18/22. Are you able to see patient in the timeframe requested.

## 2022-01-18 ENCOUNTER — TELEPHONE (OUTPATIENT)
Dept: INTERNAL MEDICINE CLINIC | Facility: CLINIC | Age: 47
End: 2022-01-18

## 2022-01-18 NOTE — TELEPHONE ENCOUNTER
Martha NGUYEN will call patient I can see him today at 11:40 AM, over wise he can see any available physician later today

## 2022-01-18 NOTE — TELEPHONE ENCOUNTER
Pt was scheduled for follow-up today with Dr. Helena Gomez for lump on head. Pt canceled appointment. Pt currently experiencing sinus congestion and 'left ear clogged'. Denies sore throat, fever.     Pt states he tested positive for COVID-19 2 weeks ago, had a

## 2022-01-28 RX ORDER — TADALAFIL 5 MG/1
TABLET ORAL
Qty: 90 TABLET | Refills: 0 | Status: SHIPPED | OUTPATIENT
Start: 2022-01-28

## 2022-01-28 NOTE — TELEPHONE ENCOUNTER
Please advise on refill. Thanks.   ·   Recent Outpatient Visits            3 months ago Ventral hernia without obstruction or gangrene    [facility] Rox Law MD    Office Visit    3 months ago Ventral hernia without obst

## 2022-02-22 ENCOUNTER — OFFICE VISIT (OUTPATIENT)
Dept: INTERNAL MEDICINE CLINIC | Facility: CLINIC | Age: 47
End: 2022-02-22
Payer: COMMERCIAL

## 2022-02-22 VITALS
SYSTOLIC BLOOD PRESSURE: 149 MMHG | DIASTOLIC BLOOD PRESSURE: 92 MMHG | HEIGHT: 72 IN | BODY MASS INDEX: 26.55 KG/M2 | HEART RATE: 116 BPM | WEIGHT: 196 LBS

## 2022-02-22 DIAGNOSIS — N52.1 ERECTILE DYSFUNCTION DUE TO DISEASES CLASSIFIED ELSEWHERE: ICD-10-CM

## 2022-02-22 DIAGNOSIS — R59.1 LYMPHADENOPATHY: ICD-10-CM

## 2022-02-22 DIAGNOSIS — H91.92 DECREASED HEARING OF LEFT EAR: Primary | ICD-10-CM

## 2022-02-22 DIAGNOSIS — K70.9 ALCOHOLIC LIVER DISEASE (HCC): ICD-10-CM

## 2022-02-22 PROCEDURE — 99214 OFFICE O/P EST MOD 30 MIN: CPT | Performed by: INTERNAL MEDICINE

## 2022-02-22 PROCEDURE — 3008F BODY MASS INDEX DOCD: CPT | Performed by: INTERNAL MEDICINE

## 2022-02-22 PROCEDURE — 3080F DIAST BP >= 90 MM HG: CPT | Performed by: INTERNAL MEDICINE

## 2022-02-22 PROCEDURE — 3077F SYST BP >= 140 MM HG: CPT | Performed by: INTERNAL MEDICINE

## 2022-02-22 RX ORDER — AMOXICILLIN 875 MG/1
875 TABLET, COATED ORAL 2 TIMES DAILY
Qty: 20 TABLET | Refills: 0 | Status: SHIPPED | OUTPATIENT
Start: 2022-02-22

## 2022-02-27 ENCOUNTER — TELEPHONE (OUTPATIENT)
Dept: INTERNAL MEDICINE CLINIC | Facility: CLINIC | Age: 47
End: 2022-02-27

## 2022-03-10 ENCOUNTER — PATIENT OUTREACH (OUTPATIENT)
Dept: CASE MANAGEMENT | Age: 47
End: 2022-03-10

## 2022-03-10 NOTE — PROGRESS NOTES
NCM placed call to patient for HFU, LM requesting a call back to [phone] with a condition update.

## 2022-03-11 NOTE — PROGRESS NOTES
Left message on mailbox for pt to call NCM back for HFU. NCM contact information included in message.

## 2022-04-05 ENCOUNTER — MED REC SCAN ONLY (OUTPATIENT)
Dept: INTERNAL MEDICINE CLINIC | Facility: CLINIC | Age: 47
End: 2022-04-05

## 2022-04-07 NOTE — TELEPHONE ENCOUNTER
Can you see this patient soon ,you scoped him in the past most likely developing alcohol associated liver disease, planning to proceed with a hernia repair in the near future No

## 2022-06-16 RX ORDER — TADALAFIL 5 MG/1
TABLET ORAL
Qty: 90 TABLET | Refills: 0 | Status: SHIPPED | OUTPATIENT
Start: 2022-06-16

## 2022-07-21 RX ORDER — METOPROLOL SUCCINATE 25 MG/1
25 TABLET, EXTENDED RELEASE ORAL 2 TIMES DAILY
Qty: 180 TABLET | Refills: 1 | Status: SHIPPED | OUTPATIENT
Start: 2022-07-21

## 2022-07-21 NOTE — TELEPHONE ENCOUNTER
Left message to call back.     Pt last OV 2/22/22- was he taking a BP medication     Med list - metoprolol ER 25 bid , last ordered 2020- 3 month supply

## 2022-07-21 NOTE — TELEPHONE ENCOUNTER
Dr. Ida Fiore (Dr. Ankit Choi out of office, IM On-call for refill requests) - Please advise, Metoprolol ER 25mg refill. Please use "[employer] on file. Patient returned our call. Only has 2 tablets left. He clarifies that he has been taking Metoprolol ER 25mg Twice daily. He denies having stopped this medication.

## 2022-07-21 NOTE — TELEPHONE ENCOUNTER
Patient calling to request a refill of his blood pressure medication. He does not know the name of the medication.    [facility]

## 2022-09-19 ENCOUNTER — TELEPHONE (OUTPATIENT)
Dept: INTERNAL MEDICINE CLINIC | Facility: CLINIC | Age: 47
End: 2022-09-19

## 2022-09-20 NOTE — TELEPHONE ENCOUNTER
Refill passed per [facility] protocol.   Requested Prescriptions   Pending Prescriptions Disp Refills    TADALAFIL 5 MG Oral Tab [Pharmacy Med Name: TADALAFIL 5 MG TABLET] 90 tablet 0     Sig: TAKE ONE TABLET BY MOUTH DAILY        Genitourinary Medications Passed - 9/20/2022 11:44 AM        Passed - Patient does not have pulmonary hypertension on problem list        Passed - In person appointment or virtual visit in the past 12 mos or appointment in next 3 mos       Recent Outpatient Visits              7 months ago Decreased hearing of left ear    [id number] [address] [place], [place]    Office Visit    11 months ago Ventral hernia without obstruction or gangrene    [facility] Rudi Salinas MD    Office Visit    11 months ago Ventral hernia without obstruction or gangrene    [facility] Rudi Salinas MD    Office Visit    11 months ago Alcoholic cirrhosis of liver without ascites [facility])    [facility] Patricia Krishnamurthy MD    Office Visit    1 year ago     [facility], [address] [place] [address], [facility] [address]    Nurse Only                       Recent Outpatient Visits              7 months ago Decreased hearing of left ear    [id number] [address] [place], [place]    Office Visit    11 months ago Ventral hernia without obstruction or gangrene    [facility] Rudi Salinas MD    Office Visit    11 months ago Ventral hernia without obstruction or gangrene    [facility] Rudi Salinas MD    Office Visit    11 months ago Alcoholic cirrhosis of liver without ascites [facility])    [facility] Patricia Krishnamurthy MD    Office Visit    1 year ago     [facility], [address] [place] [address], [facility]    Nurse Only

## 2022-09-20 NOTE — TELEPHONE ENCOUNTER
do you approve a 6 month supply? Refill passed per [facility] Ravi protocol.   Requested Prescriptions   Pending Prescriptions Disp Refills    TADALAFIL 5 MG Oral Tab [Pharmacy Med Name: TADALAFIL 5 MG TABLET] 90 tablet 0     Sig: TAKE ONE TABLET BY MOUTH DAILY        Genitourinary Medications Passed - 9/20/2022 11:46 AM        Passed - Patient does not have pulmonary hypertension on problem list        Passed - In person appointment or virtual visit in the past 12 mos or appointment in next 3 mos       Recent Outpatient Visits              7 months ago Decreased hearing of left ear    [id number] [address] [place]    Office Visit    11 months ago Ventral hernia without obstruction or gangrene    [facility] Laura Stanley MD    Office Visit    11 months ago Ventral hernia without obstruction or gangrene    [facility] Laura Stanley MD    Office Visit    11 months ago Alcoholic cirrhosis of liver without ascites [facility])    THE [facility] Vinicio Muller MD    Office Visit    1 year ago     [address], [address] [place] [address]    Nurse Only                       Recent Outpatient Visits              7 months ago Decreased hearing of left ear    [id number] [address] [place], [place]    Office Visit    11 months ago Ventral hernia without obstruction or gangrene    [facility] Laura Stanley MD    Office Visit    11 months ago Ventral hernia without obstruction or gangrene    [facility] Laura Stanley MD    Office Visit    11 months ago Alcoholic cirrhosis of liver without ascites [facility])    THE [facility] Vinicio Muller MD    Office Visit    1 year ago     [address]essa [address]ues, [address] [place] [address]    Nurse Only

## 2022-09-21 RX ORDER — TADALAFIL 5 MG/1
TABLET ORAL
Qty: 90 TABLET | Refills: 0 | Status: SHIPPED | OUTPATIENT
Start: 2022-09-21

## 2022-09-21 NOTE — TELEPHONE ENCOUNTER
Pls call pt to schedule a follow up appt. Last seen in the office on 2/22/22 and is due to f/u.   Medication has been sent to the pharmacy for refill

## 2023-01-18 PROBLEM — F10.20 ALCOHOL DEPENDENCE (HCC): Status: ACTIVE | Noted: 2023-01-18

## 2023-01-19 PROBLEM — F10.230 ALCOHOL DEPENDENCE WITH WITHDRAWAL, UNCOMPLICATED (HCC): Status: ACTIVE | Noted: 2023-01-19

## 2023-01-19 PROBLEM — F10.20 ALCOHOL USE DISORDER, SEVERE, DEPENDENCE (HCC): Status: ACTIVE | Noted: 2023-01-18

## 2023-01-25 PROBLEM — F10.230 ALCOHOL DEPENDENCE WITH WITHDRAWAL, UNCOMPLICATED (HCC): Status: RESOLVED | Noted: 2023-01-19 | Resolved: 2023-01-25

## 2023-02-02 NOTE — TELEPHONE ENCOUNTER
1st attempt- Sent MyChart message
Please call patient I refilled medications, he is due for follow-up visit, blood pressure was high last 3 visits
Spoke with the patient and he made an appt to see Dr. Arley Crane on 3-19-21 at 11:20.
Opt out
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
## 2023-05-09 ENCOUNTER — OFFICE VISIT (OUTPATIENT)
Dept: INTERNAL MEDICINE CLINIC | Facility: CLINIC | Age: 48
End: 2023-05-09

## 2023-05-09 VITALS
DIASTOLIC BLOOD PRESSURE: 92 MMHG | HEART RATE: 95 BPM | SYSTOLIC BLOOD PRESSURE: 140 MMHG | WEIGHT: 203 LBS | BODY MASS INDEX: 27.5 KG/M2 | HEIGHT: 72 IN

## 2023-05-09 DIAGNOSIS — I10 ESSENTIAL HYPERTENSION: Primary | ICD-10-CM

## 2023-05-09 DIAGNOSIS — N52.1 ERECTILE DYSFUNCTION DUE TO DISEASES CLASSIFIED ELSEWHERE: ICD-10-CM

## 2023-05-09 PROCEDURE — 99214 OFFICE O/P EST MOD 30 MIN: CPT | Performed by: NURSE PRACTITIONER

## 2023-05-09 PROCEDURE — 3008F BODY MASS INDEX DOCD: CPT | Performed by: NURSE PRACTITIONER

## 2023-05-09 PROCEDURE — 3077F SYST BP >= 140 MM HG: CPT | Performed by: NURSE PRACTITIONER

## 2023-05-09 PROCEDURE — 3080F DIAST BP >= 90 MM HG: CPT | Performed by: NURSE PRACTITIONER

## 2023-05-09 RX ORDER — TADALAFIL 5 MG/1
5 TABLET ORAL DAILY
Qty: 30 TABLET | Refills: 1 | Status: SHIPPED | OUTPATIENT
Start: 2023-05-09

## 2023-05-09 RX ORDER — LISINOPRIL 5 MG/1
5 TABLET ORAL DAILY
Qty: 90 TABLET | Refills: 3 | Status: SHIPPED | OUTPATIENT
Start: 2023-05-09 | End: 2024-05-03

## 2023-05-09 RX ORDER — METHYLPREDNISOLONE 4 MG/1
TABLET ORAL
COMMUNITY
Start: 2023-04-20 | End: 2023-05-09

## 2023-05-09 RX ORDER — METOPROLOL SUCCINATE 25 MG/1
25 TABLET, EXTENDED RELEASE ORAL 2 TIMES DAILY
Qty: 180 TABLET | Refills: 1 | Status: SHIPPED | OUTPATIENT
Start: 2023-05-09

## 2023-05-09 RX ORDER — FOLIC ACID 1 MG/1
1 TABLET ORAL DAILY
COMMUNITY
Start: 2022-03-24

## 2023-05-09 RX ORDER — GABAPENTIN 100 MG/1
CAPSULE ORAL
COMMUNITY
Start: 2023-03-23

## 2023-05-09 RX ORDER — CEFADROXIL 500 MG/1
1 CAPSULE ORAL DAILY
COMMUNITY
Start: 2022-03-24 | End: 2023-05-09

## 2023-05-09 RX ORDER — CEPHALEXIN 500 MG/1
CAPSULE ORAL
COMMUNITY
Start: 2023-04-11 | End: 2023-05-09

## 2023-06-02 NOTE — TELEPHONE ENCOUNTER
Routed to Dr Tameka Case for advise, thanks. Rx listed as historical. pls advise, thanks in advance. Refill passed per [facility] protocol   Requested Prescriptions   Pending Prescriptions Disp Refills    sertraline 50 MG Oral Tab 90 tablet 3     Sig: Take 1 tablet (50 mg total) by mouth daily.        Psychiatric Non-Scheduled (Anti-Anxiety) Passed - 6/2/2023  3:05 PM        Passed - In person appointment or virtual visit in the past 6 mos or appointment in next 3 mos     Recent Outpatient Visits              3 weeks ago Essential hypertension    [address], [address], [place]., APRN    Office Visit    1 year ago Decreased hearing of left ear    [address]    Office Visit    1 year ago Ventral hernia without obstruction or gangrene    Samantha Wiggins [place], [place] MD Karen    Office Visit    1 year ago Ventral hernia without obstruction or gangrene    WelchMelisa pineda Asp, MD    Office Visit    1 year ago Alcoholic cirrhosis of liver without ascites [facility])    [address], [address], Albert Bell MD    Office Visit          Future Appointments         Provider Department Appt Notes    In 2 weeks Luz Kovacs MD [address], [address], [place] physical/pt informed of policy

## 2023-06-29 DIAGNOSIS — N52.1 ERECTILE DYSFUNCTION DUE TO DISEASES CLASSIFIED ELSEWHERE: ICD-10-CM

## 2023-06-29 RX ORDER — TADALAFIL 5 MG/1
5 TABLET ORAL
Qty: 30 TABLET | Refills: 1 | Status: SHIPPED | OUTPATIENT
Start: 2023-06-29

## 2023-07-06 DIAGNOSIS — N52.1 ERECTILE DYSFUNCTION DUE TO DISEASES CLASSIFIED ELSEWHERE: ICD-10-CM

## 2023-07-06 RX ORDER — TADALAFIL 5 MG/1
5 TABLET ORAL DAILY
Qty: 30 TABLET | Refills: 1 | OUTPATIENT
Start: 2023-07-06

## 2023-08-24 DIAGNOSIS — N52.1 ERECTILE DYSFUNCTION DUE TO DISEASES CLASSIFIED ELSEWHERE: ICD-10-CM

## 2023-08-24 NOTE — TELEPHONE ENCOUNTER
tadalafil 5 MG Oral Tab, Take 1 tablet (5 mg total) by mouth daily as needed for Erectile Dysfunction. , Disp: 30 tablet, Rfl: 1

## 2023-08-25 NOTE — TELEPHONE ENCOUNTER
Please review; protocol failed. Or has no protocol    Requested Prescriptions   Pending Prescriptions Disp Refills    tadalafil 5 MG Oral Tab 30 tablet 1     Sig: Take 1 tablet (5 mg total) by mouth daily as needed for Erectile Dysfunction.        Genitourinary Medications Passed - 8/24/2023  3:58 PM        Passed - Patient does not have pulmonary hypertension on problem list        Passed - In person appointment or virtual visit in the past 12 mos or appointment in next 3 mos     Recent Outpatient Visits              3 months ago Essential hypertension    [address], [address], [place], Odella Perches., APRN    Office Visit    1 year ago Decreased hearing of left ear    Karol Amezcua [place], MD    Office Visit    1 year ago Ventral hernia without obstruction or gangrene    [address], [address], Franci Bowie MD    Office Visit    1 year ago Ventral hernia without obstruction or gangrene    [address], [address], Franci Bowie MD    Office Visit    1 year ago Alcoholic cirrhosis of liver without ascites [facility])    [address], [address], Irene Bell MD    Office Visit                            Recent Outpatient Visits              3 months ago Essential hypertension    [address], [address], [place], Odella Perches., APRN    Office Visit    1 year ago Decreased hearing of left ear    Karol Amezcua [place], MD    Office Visit    1 year ago Ventral hernia without obstruction or gangrene    [address], [address], Alicia Duarte MD    Office Visit    1 year ago Ventral hernia without obstruction or gangrene    Alicia Amezcua MD    Office Visit    1 year ago Alcoholic cirrhosis of liver without ascites ([facility] [address] Ana Moon MD    Office Visit

## 2023-08-28 RX ORDER — TADALAFIL 5 MG/1
5 TABLET ORAL
Qty: 30 TABLET | Refills: 1 | Status: SHIPPED | OUTPATIENT
Start: 2023-08-28

## 2023-11-16 DIAGNOSIS — N52.1 ERECTILE DYSFUNCTION DUE TO DISEASES CLASSIFIED ELSEWHERE: ICD-10-CM

## 2023-11-17 RX ORDER — TADALAFIL 5 MG/1
5 TABLET ORAL DAILY PRN
Qty: 30 TABLET | Refills: 1 | Status: SHIPPED | OUTPATIENT
Start: 2023-11-17

## 2023-11-17 NOTE — TELEPHONE ENCOUNTER
Refill passed per [facility], [facility] protocol.   Requested Prescriptions   Pending Prescriptions Disp Refills    TADALAFIL 5 MG Oral Tab [Pharmacy Med Name: TADALAFIL 5 MG TABLET] 30 tablet 1     Sig: TAKE 1 TABLET BY MOUTH DAILY AS NEEDED FOR ERECTILE DYSFUNCTION       Genitourinary Medications Passed - 11/16/2023  6:11 PM        Passed - Patient does not have pulmonary hypertension on problem list        Passed - In person appointment or virtual visit in the past 12 mos or appointment in next 3 mos     Recent Outpatient Visits              6 months ago Essential hypertension    [address], [address], [place], Mccoy Schirmer., APRN    Office Visit    1 year ago Decreased hearing of left ear    Estephania Mitchell [place], MD    Office Visit    2 years ago Ventral hernia without obstruction or gangrene    [address], [address], Milo Bowie MD    Office Visit    2 years ago Ventral hernia without obstruction or gangrene    [address], [address], Milo Bowie MD    Office Visit    2 years ago Alcoholic cirrhosis of liver without ascites [facility])    [address], [address], Marjorie Bell MD    Office Visit                         Recent Outpatient Visits              6 months ago Essential hypertension    [address], [address], [place], [facility]    Office Visit    1 year ago Decreased hearing of left ear    [address], [facility], [place]    Office Visit    2 years ago Ventral hernia without obstruction or gangrene    [address], [address], Milo Bowie MD    Office Visit    2 years ago Ventral hernia without obstruction or gangrene    Savanna Mitchell MD    Office Visit    2 years ago Alcoholic cirrhosis of liver without ascites [facility])    [address], [address], Estrella Bell MD    Office Visit

## 2023-12-20 ENCOUNTER — TELEPHONE (OUTPATIENT)
Dept: NEUROSURGERY | Age: 48
End: 2023-12-20

## 2023-12-27 ENCOUNTER — TELEPHONE (OUTPATIENT)
Dept: INTERNAL MEDICINE CLINIC | Facility: CLINIC | Age: 48
End: 2023-12-27

## 2023-12-27 NOTE — TELEPHONE ENCOUNTER
Current Outpatient Medications   Medication Sig Dispense Refill      30 tablet 1    sertraline 50 MG Oral Tab Take 3 tablets (150 mg total) by mouth daily.  90 tablet 3                           90 tablet 3      180 tablet 1      1 each 0             90 tablet 3

## 2023-12-28 ENCOUNTER — TELEPHONE (OUTPATIENT)
Dept: INTERNAL MEDICINE CLINIC | Facility: CLINIC | Age: 48
End: 2023-12-28

## 2024-01-08 ENCOUNTER — APPOINTMENT (OUTPATIENT)
Dept: NEUROSURGERY | Age: 49
End: 2024-01-08

## 2024-01-30 ENCOUNTER — TELEPHONE (OUTPATIENT)
Dept: NEUROSURGERY | Age: 49
End: 2024-01-30

## 2024-03-23 DIAGNOSIS — N52.1 ERECTILE DYSFUNCTION DUE TO DISEASES CLASSIFIED ELSEWHERE: ICD-10-CM

## 2024-03-25 RX ORDER — TADALAFIL 5 MG/1
5 TABLET ORAL DAILY PRN
Qty: 30 TABLET | Refills: 1 | Status: SHIPPED | OUTPATIENT
Start: 2024-03-25

## 2024-03-25 NOTE — TELEPHONE ENCOUNTER
Refill Per Protocol     Requested Prescriptions   Pending Prescriptions Disp Refills    TADALAFIL 5 MG Oral Tab [Pharmacy Med Name: TADALAFIL 5 MG TABLET] 30 tablet 1     Sig: TAKE 1 TABLET BY MOUTH ONCE DAILY AS NEEDED       Genitourinary Medications Passed - 3/23/2024 10:57 AM        Passed - Patient does not have pulmonary hypertension on problem list        Passed - In person appointment or virtual visit in the past 12 mos or appointment in next 3 mos     Recent Outpatient Visits              10 months ago Essential hypertension    [facility] [place], [address], [place] Sona Hickey APRN    Office Visit    2 years ago Decreased hearing of left ear    [facility], [address], [place] Eusebia Ackerman MD    Office Visit    2 years ago Ventral hernia without obstruction or gangrene    [facility], Gerson Unger MD    Office Visit    2 years ago Ventral hernia without obstruction or gangrene    [facility], Gerson Unger MD    Office Visit    2 years ago Alcoholic cirrhosis of liver without ascites (HCC)    [facility], Manav Tan MD    Office Visit                               Recent Outpatient Visits              10 months ago Essential hypertension    [facility], [address], [place] Sona Hickey APRN    Office Visit    2 years ago Decreased hearing of left ear    [facility], [address], [place] Eusebia Ackerman MD    Office Visit    2 years ago Ventral hernia without obstruction or gangrene    [facility], Gerson Unger MD    Office Visit    2 years ago Ventral hernia without obstruction or gangrene    [facility], Gerson Unger MD    Office Visit    2 years ago Alcoholic cirrhosis of liver without ascites (HCC)    [facility]  [facility] [place] Manav Ortega MD    Office Visit

## 2024-06-20 ENCOUNTER — TELEPHONE (OUTPATIENT)
Dept: INTERNAL MEDICINE CLINIC | Facility: CLINIC | Age: 49
End: 2024-06-20

## 2024-06-20 DIAGNOSIS — N52.1 ERECTILE DYSFUNCTION DUE TO DISEASES CLASSIFIED ELSEWHERE: ICD-10-CM

## 2024-06-24 NOTE — TELEPHONE ENCOUNTER
Please review. Protocol Failed; No Protocol    No future appointments.     Routed to Patient  for assistance with appointment.     Requested Prescriptions   Pending Prescriptions Disp Refills    TADALAFIL 5 MG Oral Tab [Pharmacy Med Name: TADALAFIL 5 MG TABLET] 30 tablet 1     Sig: TAKE 1 TABLET BY MOUTH DAILY IF NEEDED       Genitourinary Medications Failed - 6/20/2024  5:24 PM        Failed - In person appointment or virtual visit in the past 12 mos or appointment in next 3 mos     Recent Outpatient Visits              1 year ago Essential hypertension    [facility], [address], [place] Sona Hickey APRN    Office Visit    2 years ago Decreased hearing of left ear    [facility], [address], [place] Eusebia Ackerman MD    Office Visit    2 years ago Ventral hernia without obstruction or gangrene    [facility], Gerson Unger MD    Office Visit    2 years ago Ventral hernia without obstruction or gangrene    [facility]    Office Visit    2 years ago Alcoholic cirrhosis of liver without ascites (HCC)    [facility]    Office Visit                      Passed - Patient does not have pulmonary hypertension on problem list                 Recent Outpatient Visits              1 year ago Essential hypertension    [facility], [address], [place] Sona Hickey APRN    Office Visit    2 years ago Decreased hearing of left ear    [facility], [address], [place] Eusebia Ackerman MD    Office Visit    2 years ago Ventral hernia without obstruction or gangrene    [facility]    Office Visit    2 years ago Ventral hernia without obstruction or gangrene    [facility],  Gerson Unger MD    Office Visit    2 years ago Alcoholic cirrhosis of liver without ascites (HCC)    [facility], [facility], Manav Tan MD    Office Visit

## 2024-06-25 ENCOUNTER — TELEPHONE (OUTPATIENT)
Dept: INTERNAL MEDICINE CLINIC | Facility: CLINIC | Age: 49
End: 2024-06-25

## 2024-06-25 DIAGNOSIS — N52.1 ERECTILE DYSFUNCTION DUE TO DISEASES CLASSIFIED ELSEWHERE: ICD-10-CM

## 2024-06-26 RX ORDER — TADALAFIL 5 MG/1
5 TABLET ORAL DAILY
Qty: 30 TABLET | Refills: 1 | OUTPATIENT
Start: 2024-06-26

## 2024-06-26 RX ORDER — TADALAFIL 5 MG/1
5 TABLET ORAL DAILY PRN
Qty: 30 TABLET | Refills: 0 | Status: SHIPPED | OUTPATIENT
Start: 2024-06-26

## 2024-06-26 NOTE — TELEPHONE ENCOUNTER
2nd attempt/MyChart message was sent to the patient to schedule an appointment per the below request.

## 2024-06-26 NOTE — TELEPHONE ENCOUNTER
Talked to patient appointment made and he says he is going out of town and he needs his prescription medication,  Please advise.

## 2024-06-26 NOTE — TELEPHONE ENCOUNTER
Patient scheduled an appointment for 07/23/2024- he is going out of town and would like to get his prescription before his appointment-please advise. Thank you

## 2024-06-26 NOTE — TELEPHONE ENCOUNTER
Patient needs appointment:    Please call patient I cannot refill to Tadalafil he was not seen in the office for 2-1/2 years.  Can see any available provider at the clinic for future refills

## 2024-06-26 NOTE — TELEPHONE ENCOUNTER
Please call patient I cannot refill to Liliana he was not seen in the office for 2-1/2 years.  Can see any available provider at the clinic for future refills

## 2024-08-05 ENCOUNTER — TELEPHONE (OUTPATIENT)
Dept: INTERNAL MEDICINE CLINIC | Facility: CLINIC | Age: 49
End: 2024-08-05

## 2024-08-05 DIAGNOSIS — N52.1 ERECTILE DYSFUNCTION DUE TO DISEASES CLASSIFIED ELSEWHERE: ICD-10-CM

## 2024-08-05 NOTE — TELEPHONE ENCOUNTER
Pharmacy requesting refill of       tadalafil 5 MG Oral Tab, Take 1 tablet (5 mg total) by mouth daily as needed., Disp: 30 tablet, Rfl: 0

## 2024-08-09 RX ORDER — TADALAFIL 5 MG/1
5 TABLET ORAL DAILY PRN
Qty: 30 TABLET | Refills: 0 | OUTPATIENT
Start: 2024-08-09

## 2024-08-09 NOTE — TELEPHONE ENCOUNTER
Please review. Protocol Failed; No Protocol    No future appointments.     Routed to Patient  for assistance with appointment.     Requested Prescriptions   Pending Prescriptions Disp Refills    tadalafil 5 MG Oral Tab 30 tablet 0     Sig: Take 1 tablet (5 mg total) by mouth daily as needed.       Genitourinary Medications Failed - 8/5/2024  3:31 PM        Failed - In person appointment or virtual visit in the past 12 mos or appointment in next 3 mos     Recent Outpatient Visits              1 year ago Essential hypertension    [facility], [address], [place] Sona Hickey APRN    Office Visit    2 years ago Decreased hearing of left ear    [facility], [address], [place] Eusebia Ackerman MD    Office Visit    2 years ago [facility]    [facility]    2 years ago Ventral hernia without obstruction or gangrene    [facility]    Office Visit    2 years ago Alcoholic cirrhosis of liver without ascites (HCC)    [facility]    Office Visit                      Passed - Patient does not have pulmonary hypertension on problem list                 Recent Outpatient Visits              1 year ago Essential hypertension    [facility], [address], [place] Sona Hickey APRN    Office Visit    2 years ago Decreased hearing of left ear    [facility], [address], [place] Eusebia Ackerman MD    Office Visit    2 years ago Ventral hernia without obstruction or gangrene    [facility]    Office Visit    2 years ago Ventral hernia without obstruction or gangrene    [facility]    Office  Visit    2 years ago Alcoholic cirrhosis of liver without ascites (HCC)    [facility], [place]    Office Visit

## 2024-09-19 NOTE — TELEPHONE ENCOUNTER
Call from Brea at [facility] .   Patient seeking refill tadalafil.  Advised it was declined as he needs appointment.   Brea will inform patient it was denied, he should call the office.

## 2025-04-28 NOTE — TELEPHONE ENCOUNTER
sertraline 50 MG Oral Tab Take 3 tablets (150 mg total) by mouth daily. 270 tablet 3     Refill Requested

## 2025-04-29 ENCOUNTER — TELEPHONE (OUTPATIENT)
Dept: INTERNAL MEDICINE CLINIC | Facility: CLINIC | Age: 50
End: 2025-04-29

## 2025-04-29 NOTE — TELEPHONE ENCOUNTER
Patient calling to state received notification via [employer] but is unable to access [employer], informed patient request is being reviewed, but is due for a visit. Patient stated does not have insurance at the moment so declined to schedule.

## 2025-04-29 NOTE — TELEPHONE ENCOUNTER
Patient called requesting this refill. He declined to schedule an appointment as I offered the first available appointment and he declined.

## 2025-04-29 NOTE — TELEPHONE ENCOUNTER
Please review. Protocol Failed; No Protocol    No future appointments.    [employer] message sent to patient to schedule an office visit with Provider and/or  Routed to Patient  for assistance with appointment.

## 2025-04-30 NOTE — TELEPHONE ENCOUNTER
On Call:  Pt called requesting urgent refill on sertraline 50 mg, out of meds for past week. Has not been to clinic since 2023.  Informed patient that he will get a temporary refill of 30 tablets and then afterwards will need to schedule an appointment for any future refills.  Patient verbalized understanding and is agreeable.

## 2025-05-06 ENCOUNTER — OFFICE VISIT (OUTPATIENT)
Dept: INTERNAL MEDICINE CLINIC | Facility: CLINIC | Age: 50
End: 2025-05-06

## 2025-05-06 ENCOUNTER — TELEPHONE (OUTPATIENT)
Dept: INTERNAL MEDICINE CLINIC | Facility: CLINIC | Age: 50
End: 2025-05-06

## 2025-05-06 VITALS
DIASTOLIC BLOOD PRESSURE: 89 MMHG | SYSTOLIC BLOOD PRESSURE: 133 MMHG | HEIGHT: 72 IN | WEIGHT: 200 LBS | BODY MASS INDEX: 27.09 KG/M2 | HEART RATE: 84 BPM

## 2025-05-06 DIAGNOSIS — Z96.89 SPINAL CORD STIMULATOR STATUS: ICD-10-CM

## 2025-05-06 DIAGNOSIS — N52.1 ERECTILE DYSFUNCTION DUE TO DISEASES CLASSIFIED ELSEWHERE: ICD-10-CM

## 2025-05-06 DIAGNOSIS — R41.3 MEMORY CHANGES: ICD-10-CM

## 2025-05-06 DIAGNOSIS — Z00.00 PHYSICAL EXAM, ANNUAL: Primary | ICD-10-CM

## 2025-05-06 DIAGNOSIS — F10.11 HISTORY OF ALCOHOL ABUSE: ICD-10-CM

## 2025-05-06 PROCEDURE — 99396 PREV VISIT EST AGE 40-64: CPT | Performed by: INTERNAL MEDICINE

## 2025-05-06 RX ORDER — TADALAFIL 5 MG/1
5 TABLET ORAL DAILY PRN
Qty: 30 TABLET | Refills: 0 | Status: SHIPPED | OUTPATIENT
Start: 2025-05-06

## 2025-05-06 RX ORDER — SERTRALINE HYDROCHLORIDE 100 MG/1
TABLET, FILM COATED ORAL
Qty: 90 TABLET | Refills: 0 | Status: SHIPPED | OUTPATIENT
Start: 2025-05-06

## 2025-05-06 NOTE — PROGRESS NOTES
Subjective:     Patient ID: Dayton Espinosa is a 49 year old male.  Presents for physical exam    HPI  Patient reports that overall he has been feeling well physically.  No headaches or chest pains or shortness of breath, no abdominal pain diarrhea constipation no difficulty breathing, he quit drinking alcohol sometime ago, complains of mood swings, depressive symptoms, that were not bad few months ago, his father present in the room and confirms that, father states that being on sertraline 100 mg daily made a huge difference.  Patient states that he feels that his memory is not the same he cannot recall or make decision the same way he used to in the past.  He is former alcoholic claims that he is abstinent of alcohol for a while.  He had multiple concussions in his life.  States that he had stroke as well.  He has not seen physician in couple years.  States that he is eating healthy, needs refill on Cialis.  Patient seen in the presence of his father    Review of Systems  ROS:     Constitutional:  Negative for decreased activity, fever, irritability and lethargy  Cardiovascular:  Negative for chest pain and irregular heartbeat/palpitations  Respiratory:  Negative for cough, dyspnea and wheezing.  Eyes:  Negative for eye discharge and vision loss  Endocrine:  Negative for polydipsia and polyphagia  Integumentary:  Negative for pruritus and rash  Neurological:  Negative for gait disturbance, paresthesias.   Psychiatric:  Negative for inappropriate interaction and psychiatric symptoms  Current Medications[1]  Allergies:Allergies[2]    Past Medical History[3]   Past Surgical History[4]   Family History[5]   Social History: Short Social Hx on File[6]     /89 (BP Location: Left arm, Patient Position: Sitting, Cuff Size: adult)   Pulse 84   Ht 6' (1.829 m)   Wt 200 lb (90.7 kg)   BMI 27.12 kg/m²    Physical Exam  Constitutional:       Appearance: Normal appearance.   HENT:      Head: Normocephalic and  atraumatic.   Eyes:      General: No scleral icterus.     Extraocular Movements: Extraocular movements intact.      Conjunctiva/sclera: Conjunctivae normal.      Pupils: Pupils are equal, round, and reactive to light.   Neck:      Vascular: No carotid bruit.   Cardiovascular:      Rate and Rhythm: Normal rate and regular rhythm.      Heart sounds: No murmur heard.     No gallop.   Pulmonary:      Effort: No respiratory distress.      Breath sounds: No wheezing or rhonchi.   Abdominal:      General: Bowel sounds are normal. There is no distension.      Palpations: Abdomen is soft. There is no hepatomegaly, splenomegaly or mass.      Tenderness: There is no abdominal tenderness. There is no right CVA tenderness or left CVA tenderness.   Musculoskeletal:         General: Normal range of motion.      Cervical back: Normal range of motion and neck supple.      Right lower leg: No edema.      Left lower leg: No edema.   Lymphadenopathy:      Cervical: No cervical adenopathy.   Skin:     General: Skin is warm.      Coloration: Skin is not jaundiced.   Neurological:      General: No focal deficit present.      Mental Status: He is alert and oriented to person, place, and time. Mental status is at baseline.      Gait: Gait normal.   Psychiatric:         Mood and Affect: Mood normal.         Behavior: Behavior normal.         Judgment: Judgment normal.         Assessment & Plan:   1. Memory changes etiology to be determined, possible combination of multiple brain injuries with chronic alcohol use in the past, will check CT scan of the brain patient has spinal stimulator, see neurologist/patient lacking medical insurance at this point   2. Physical exam, annual we will check labs, continue healthy diet regular physical activities   3.  Erectile dysfunction     refill Cialis    Orders Placed This Encounter   Procedures    CBC With Differential With Platelet    Comp Metabolic Panel (14)    TSH W Reflex To Free T4    Vitamin B12     Vitamin B1 (Thiamine), Blood [E]    LIPID PANEL [7600] [Q]     Follow-up as needed  Meds This Visit:  Requested Prescriptions     Signed Prescriptions Disp Refills    sertraline 100 MG Oral Tab 90 tablet 0     Sig: Take 1 tab po daily       Imaging & Referrals:  MRI BRAIN (CPT=70551)  [place] [facility] [place]            [1]   Current Outpatient Medications   Medication Sig Dispense Refill    sertraline 100 MG Oral Tab Take 1 tab po daily 90 tablet 0    sertraline 50 MG Oral Tab Take 1 tablet (50 mg total) by mouth daily. NO FURTHER REFILLS, NEEDS APPT (Patient taking differently: Take 2 tablets (100 mg total) by mouth daily. NO FURTHER REFILLS, NEEDS APPT) 30 tablet 0    sertraline 50 MG Oral Tab Take 3 tablets (150 mg total) by mouth daily. 270 tablet 3    gabapentin 100 MG Oral Cap       folic acid 1 MG Oral Tab Take 1 tablet (1 mg total) by mouth daily.      metoprolol succinate ER 25 MG Oral Tablet 24 Hr Take 1 tablet (25 mg total) by mouth 2 (two) times daily. 180 tablet 1    Naltrexone (VIVITROL) 380 MG Intramuscular Recon Susp  1 each 0    aspirin 81 MG Oral Tab EC Take 1 tablet (81 mg total) by mouth daily.      Pantoprazole Sodium 40 MG Oral Tab EC Take 1 tablet (40 mg total) by mouth every morning before breakfast. 90 tablet 3    tadalafil 5 MG Oral Tab Take 1 tablet (5 mg total) by mouth daily as needed. 30 tablet 0   [2]   Allergies  Allergen Reactions    Cyclobenzaprine UNKNOWN and OTHER (SEE COMMENTS)   [3]   Past Medical History:   Anxiety with depression    Chronic pain syndrome    DDD (degenerative disc disease), lumbar    GERD (gastroesophageal reflux disease)    Gout    Hypertension    Steatohepatitis    Stroke (HCC)   [4]   Past Surgical History:  Procedure Laterality Date    Appendectomy  1995    Colonoscopy  11/11/2020    Dr. Jeffries    Egd  11/11/2020    Egd  09/15/2021    Laminectomy,lumbar  12/2010    Lap vent/abd hernia repair  10/05/2021    w/mesh    Laparoscopic cholecystectomy   12/03/2020    w/adhesiolysis, biliary dyskinesia; no associated bleeding complications    Lipoma removal Right 2009    flank    Lumbar spine fusion combined  12/2010    Needle biopsy liver  12/03/2020    Stereotact stim spinal cord  2013    stimulator placed for pain management    Umbilical hernia repair  07/27/2011    w/ mesh   [5]   Family History  Problem Relation Age of Onset    Hypertension Father         65    Lipids Father 65        Hyperlipidemia    OCD Mother     OCD Maternal Grandfather     DVT/VTE Maternal Grandfather         maybe immobilization     OCD Brother     Bleeding Disorders Neg     Cancer Neg    [6]   Social History  Socioeconomic History    Marital status:     Number of children: 2   Occupational History    Occupation: [employer]     Employer: [employer]   Tobacco Use    Smoking status: Never     Passive exposure: Never    Smokeless tobacco: Former     Types: Chew    Tobacco comments:     chewed for a few years during college; now resolved   Vaping Use    Vaping status: Never Used   Substance and Sexual Activity    Alcohol use: Not Currently     Comment: Last drink 2/8/23. He drank a few beers.    Drug use: Yes     Frequency: 7.0 times per week     Types: Cannabis, Cocaine     Comment: Last use of gummies \"over the weekend.\" \"Cocaine, about a month ago, not sure about how much.\"   Other Topics Concern    Caffeine Concern No   Social History Narrative    Aayush is  to Lizbeth x 14 yrs. Father of 2 children. Patient works in manufacturing. He and his family live in [place], [place]     Social Drivers of Health      Received from [employer]    [facility]

## 2025-05-07 ENCOUNTER — TELEPHONE (OUTPATIENT)
Dept: FAMILY MEDICINE CLINIC | Facility: CLINIC | Age: 50
End: 2025-05-07

## 2025-05-07 NOTE — TELEPHONE ENCOUNTER
Patient states Dr Ackerman ordered an MRI of the brain \"but I can't do it since I have neuro stimulator implant\"      Please advise

## 2025-05-08 ENCOUNTER — TELEPHONE (OUTPATIENT)
Dept: INTERNAL MEDICINE CLINIC | Facility: CLINIC | Age: 50
End: 2025-05-08

## 2025-05-08 NOTE — TELEPHONE ENCOUNTER
Patients dose was switched to Sertraline 100mg once per day at office visit on 2025 with Dr. Ackerman     Sertraline 100m day supply sent to [facility] in [place] on 2025

## 2025-05-08 NOTE — TELEPHONE ENCOUNTER
sertraline 50 MG Oral Tab, Take 3 tablets (150 mg total) by mouth daily., Disp: 270 tablet, Rfl: 3

## 2025-05-09 ENCOUNTER — TELEPHONE (OUTPATIENT)
Age: 50
End: 2025-05-09

## 2025-05-09 NOTE — TELEPHONE ENCOUNTER
Hello,  Sorry I missed you - I am reaching out from the [facility] department, following up on an order from your provider's office to assist in connecting you with resources for care. If you would like to discuss this further, please give us a call back at [phone], or for more immediate assistance you can contact our 24-hour help line at [phone] We look forward to hearing from you soon.

## 2025-05-14 ENCOUNTER — TELEPHONE (OUTPATIENT)
Dept: INTERNAL MEDICINE CLINIC | Facility: CLINIC | Age: 50
End: 2025-05-14

## 2025-05-29 ENCOUNTER — TELEPHONE (OUTPATIENT)
Age: 50
End: 2025-05-29

## 2025-05-29 NOTE — TELEPHONE ENCOUNTER
Hello,  Sorry I missed you - I am reaching out from the [facility] Navigation department, following up on an order from your provider's office to assist in connecting you with resources for care. If you would like to discuss this further, please give us a call back at [phone], or for more immediate assistance you can contact our 24-hour help line at [phone] We look forward to hearing from you soon.

## 2025-08-06 RX ORDER — SERTRALINE HYDROCHLORIDE 100 MG/1
100 TABLET, FILM COATED ORAL DAILY
Qty: 90 TABLET | Refills: 1 | Status: SHIPPED | OUTPATIENT
Start: 2025-08-06

## 2025-08-07 DIAGNOSIS — N52.1 ERECTILE DYSFUNCTION DUE TO DISEASES CLASSIFIED ELSEWHERE: ICD-10-CM

## 2025-08-11 RX ORDER — TADALAFIL 5 MG/1
5 TABLET ORAL DAILY PRN
Qty: 30 TABLET | Refills: 0 | Status: SHIPPED | OUTPATIENT
Start: 2025-08-11

## (undated) DEVICE — 3M™ IOBAN™ 2 ANTIMICROBIAL INCISE DRAPE 6650EZ: Brand: IOBAN™ 2

## (undated) DEVICE — ENCORE® LATEX ACCLAIM SIZE 8, STERILE LATEX POWDER-FREE SURGICAL GLOVE: Brand: ENCORE

## (undated) DEVICE — TROCAR: Brand: KII® SLEEVE

## (undated) DEVICE — LAP CHOLE: Brand: MEDLINE INDUSTRIES, INC.

## (undated) DEVICE — [HIGH FLOW INSUFFLATOR,  DO NOT USE IF PACKAGE IS DAMAGED,  KEEP DRY,  KEEP AWAY FROM SUNLIGHT,  PROTECT FROM HEAT AND RADIOACTIVE SOURCES.]: Brand: PNEUMOSURE

## (undated) DEVICE — SUTURE VICRYL 0 J906G

## (undated) DEVICE — ENDO TACKER PROTACK 174006

## (undated) DEVICE — TROCAR: Brand: KII FIOS FIRST ENTRY

## (undated) DEVICE — SOL  .9 1000ML BTL

## (undated) DEVICE — INSUFFLATION NEEDLE TO ESTABLISH PNEUMOPERITONEUM.: Brand: INSUFFLATION NEEDLE

## (undated) DEVICE — ENCORE® LATEX MICRO SIZE 8, STERILE LATEX POWDER-FREE SURGICAL GLOVE: Brand: ENCORE

## (undated) DEVICE — UNDYED BRAIDED (POLYGLACTIN 910), SYNTHETIC ABSORBABLE SUTURE: Brand: COATED VICRYL

## (undated) DEVICE — SUTURE VICRYL 0 UR-6

## (undated) NOTE — LETTER
12/08/20        [address] [place], [place] [id number] [facility]      Dear Luz Arora,    [facility] records indicate that you have outstanding lab work and or testing that was ordered for you and has not yet been completed:  US LIVER WITH ELASTOGRAPHY    Please

## (undated) NOTE — LETTER
Patient: Yudelka Quiroga   YOB: 1975   Date of Visit: 10/13/2021     Dear  Dr. Rj Stratton MD,    Thank you for referring Yudelka Quiroga to my practice. Please find my assessment and plan below.       Ventral hernia without obstruction or gangrene

## (undated) NOTE — LETTER
10/1/2019              Parvindeannaaida Tobinaida        [id number] [address]         Please excuse patient from work from 10/1/2019 for approximately 2 weeks due to concussion of the brain.       Sincerely,    MD Christina Eubanks

## (undated) NOTE — LETTER
Patient: Aretha Jenkins   YOB: 1975   Date of Visit: 12/14/2020     Dear  Dr. Missy Smith MD,    Thank you for referring Aretha Jenkins to my practice. Please find my assessment and plan below.       Chronic cholecystitis  (primary encounter diag

## (undated) NOTE — LETTER
VACCINE ADMINISTRATION RECORD  PARENT / GUARDIAN APPROVAL  Date: 2021  Vaccine administered to: Sathish Sepulveda     : 1975    MRN: [id number]    A copy of the appropriate Centers for Disease Control and Prevention Vaccine Information statement h

## (undated) NOTE — LETTER
10/1/2019              Aliyah Holland        [id number] [address]         Please excuse patient from work from 10/1/2019 for approximately 2 weeks ago due to concussion.       Sincerely,    Isaiah Jeans, MD [facility]

## (undated) NOTE — ED AVS SNAPSHOT
Aretha Jenkins   MRN: [id number]    Department:  [facility] Emergency Department   Date of Visit:  9/23/2019           Disclosure     Insurance plans vary and the physician(s) referred by the ER may not be covered by your plan.  Please contact y CARE PHYSICIAN AT ONCE OR RETURN IMMEDIATELY TO THE EMERGENCY DEPARTMENT. If you have been prescribed any medication(s), please fill your prescription right away and begin taking the medication(s) as directed.   If you believe that any of the medications

## (undated) NOTE — LETTER
Patient: Dalia Barba   YOB: 1975   Date of Visit: 8/2/2021     Dear  Dr. Srinivasa Streeter MD,    Thank you for referring Dalia Barba to my practice. Please find my assessment and plan below.           Incisional hernia without obstruction or marcell

## (undated) NOTE — LETTER
Patient: Yung Welch   YOB: 1975   Date of Visit: 11/30/2020     Dear  Dr. Stephenie King MD,    Thank you for referring Yung Welch to my practice. Please find my assessment and plan below.           Chronic cholecystitis  (primary encounter